# Patient Record
Sex: MALE | Race: WHITE | NOT HISPANIC OR LATINO | Employment: FULL TIME | URBAN - METROPOLITAN AREA
[De-identification: names, ages, dates, MRNs, and addresses within clinical notes are randomized per-mention and may not be internally consistent; named-entity substitution may affect disease eponyms.]

---

## 2023-03-09 LAB
ALANINE AMINOTRANSFERASE (SGPT) (U/L) IN SER/PLAS: 26 U/L (ref 10–52)
ALBUMIN (G/DL) IN SER/PLAS: 4.4 G/DL (ref 3.4–5)
ALKALINE PHOSPHATASE (U/L) IN SER/PLAS: 93 U/L (ref 33–136)
ANION GAP IN SER/PLAS: 16 MMOL/L (ref 10–20)
ASPARTATE AMINOTRANSFERASE (SGOT) (U/L) IN SER/PLAS: 16 U/L (ref 9–39)
BILIRUBIN TOTAL (MG/DL) IN SER/PLAS: 0.7 MG/DL (ref 0–1.2)
CALCIUM (MG/DL) IN SER/PLAS: 9.8 MG/DL (ref 8.6–10.6)
CARBON DIOXIDE, TOTAL (MMOL/L) IN SER/PLAS: 29 MMOL/L (ref 21–32)
CHLORIDE (MMOL/L) IN SER/PLAS: 98 MMOL/L (ref 98–107)
CHOLESTEROL (MG/DL) IN SER/PLAS: 122 MG/DL (ref 0–199)
CHOLESTEROL IN HDL (MG/DL) IN SER/PLAS: 39.3 MG/DL
CHOLESTEROL/HDL RATIO: 3.1
CREATININE (MG/DL) IN SER/PLAS: 0.96 MG/DL (ref 0.5–1.3)
ESTIMATED AVERAGE GLUCOSE FOR HBA1C: 157 MG/DL
GFR MALE: 87 ML/MIN/1.73M2
GLUCOSE (MG/DL) IN SER/PLAS: 125 MG/DL (ref 74–99)
HEMOGLOBIN A1C/HEMOGLOBIN TOTAL IN BLOOD: 7.1 %
LDL: 59 MG/DL (ref 0–99)
POTASSIUM (MMOL/L) IN SER/PLAS: 4.5 MMOL/L (ref 3.5–5.3)
PROTEIN TOTAL: 7.3 G/DL (ref 6.4–8.2)
SODIUM (MMOL/L) IN SER/PLAS: 138 MMOL/L (ref 136–145)
THYROTROPIN (MIU/L) IN SER/PLAS BY DETECTION LIMIT <= 0.05 MIU/L: 2.66 MIU/L (ref 0.44–3.98)
TRIGLYCERIDE (MG/DL) IN SER/PLAS: 120 MG/DL (ref 0–149)
UREA NITROGEN (MG/DL) IN SER/PLAS: 28 MG/DL (ref 6–23)
VLDL: 24 MG/DL (ref 0–40)

## 2023-03-13 PROBLEM — E66.3 OVERWEIGHT WITH BODY MASS INDEX (BMI) OF 29 TO 29.9 IN ADULT: Status: ACTIVE | Noted: 2023-03-13

## 2023-03-13 PROBLEM — E78.00 ELEVATED LOW DENSITY LIPOPROTEIN (LDL) CHOLESTEROL LEVEL: Status: ACTIVE | Noted: 2023-03-13

## 2023-03-13 PROBLEM — M70.42 PREPATELLAR BURSITIS OF LEFT KNEE: Status: ACTIVE | Noted: 2023-03-13

## 2023-03-13 PROBLEM — Z97.3 WEARS READING EYEGLASSES: Status: ACTIVE | Noted: 2023-03-13

## 2023-03-13 PROBLEM — R21 RASH: Status: ACTIVE | Noted: 2023-03-13

## 2023-03-13 PROBLEM — H25.099 SENILE INCIPIENT CATARACT: Status: ACTIVE | Noted: 2023-03-13

## 2023-03-13 PROBLEM — I10 HYPERTENSION: Status: ACTIVE | Noted: 2023-03-13

## 2023-03-13 PROBLEM — H53.121 TRANSIENT VISUAL LOSS OF RIGHT EYE: Status: ACTIVE | Noted: 2023-03-13

## 2023-03-13 PROBLEM — G45.3 AF (AMAUROSIS FUGAX): Status: ACTIVE | Noted: 2023-03-13

## 2023-03-13 PROBLEM — E11.9 DIABETES MELLITUS (MULTI): Status: ACTIVE | Noted: 2023-03-13

## 2023-03-13 PROBLEM — E03.9 HYPOTHYROIDISM: Status: ACTIVE | Noted: 2023-03-13

## 2023-03-13 PROBLEM — R07.9 CHEST PAIN: Status: ACTIVE | Noted: 2023-03-13

## 2023-03-13 PROBLEM — M79.89 LEG SWELLING: Status: ACTIVE | Noted: 2023-03-13

## 2023-03-13 PROBLEM — M25.562 LEFT KNEE PAIN: Status: ACTIVE | Noted: 2023-03-13

## 2023-03-13 PROBLEM — I25.10 CAD (CORONARY ARTERY DISEASE): Status: ACTIVE | Noted: 2023-03-13

## 2023-03-13 PROBLEM — H35.61 RETINAL DOT HEMORRHAGE OF RIGHT EYE: Status: ACTIVE | Noted: 2023-03-13

## 2023-03-13 RX ORDER — ATORVASTATIN CALCIUM 80 MG/1
80 TABLET, FILM COATED ORAL NIGHTLY
COMMUNITY
Start: 2012-07-03 | End: 2024-02-27 | Stop reason: SDUPTHER

## 2023-03-13 RX ORDER — METOPROLOL SUCCINATE 100 MG/1
100 TABLET, EXTENDED RELEASE ORAL DAILY
COMMUNITY
Start: 2013-09-06 | End: 2024-02-27 | Stop reason: SDUPTHER

## 2023-03-13 RX ORDER — METFORMIN HYDROCHLORIDE 500 MG/1
500 TABLET ORAL 2 TIMES DAILY
COMMUNITY
Start: 2022-05-06 | End: 2023-11-21 | Stop reason: ALTCHOICE

## 2023-03-13 RX ORDER — LEVOTHYROXINE SODIUM 75 UG/1
75 TABLET ORAL DAILY
COMMUNITY
Start: 2013-02-08 | End: 2023-04-17 | Stop reason: SDUPTHER

## 2023-03-13 RX ORDER — ASPIRIN 325 MG
325 TABLET ORAL DAILY
COMMUNITY
Start: 2013-09-06

## 2023-03-13 RX ORDER — AMLODIPINE BESYLATE 5 MG/1
5 TABLET ORAL DAILY
COMMUNITY
Start: 2015-03-16 | End: 2024-02-27 | Stop reason: SDUPTHER

## 2023-03-13 RX ORDER — LISINOPRIL 40 MG/1
40 TABLET ORAL DAILY
COMMUNITY
End: 2024-01-05 | Stop reason: ALTCHOICE

## 2023-03-13 RX ORDER — SPIRONOLACTONE AND HYDROCHLOROTHIAZIDE 25; 25 MG/1; MG/1
1 TABLET ORAL DAILY
COMMUNITY
Start: 2018-05-21 | End: 2024-02-27 | Stop reason: SDUPTHER

## 2023-03-15 ENCOUNTER — OFFICE VISIT (OUTPATIENT)
Dept: PRIMARY CARE | Facility: CLINIC | Age: 68
End: 2023-03-15
Payer: MEDICARE

## 2023-03-15 VITALS
SYSTOLIC BLOOD PRESSURE: 124 MMHG | WEIGHT: 215.8 LBS | DIASTOLIC BLOOD PRESSURE: 70 MMHG | BODY MASS INDEX: 30.21 KG/M2 | HEIGHT: 71 IN

## 2023-03-15 DIAGNOSIS — E13.9 OTHER SPECIFIED DIABETES MELLITUS WITHOUT COMPLICATION, WITHOUT LONG-TERM CURRENT USE OF INSULIN (MULTI): ICD-10-CM

## 2023-03-15 PROCEDURE — 3078F DIAST BP <80 MM HG: CPT | Performed by: INTERNAL MEDICINE

## 2023-03-15 PROCEDURE — 3051F HG A1C>EQUAL 7.0%<8.0%: CPT | Performed by: INTERNAL MEDICINE

## 2023-03-15 PROCEDURE — 3074F SYST BP LT 130 MM HG: CPT | Performed by: INTERNAL MEDICINE

## 2023-03-15 PROCEDURE — 4010F ACE/ARB THERAPY RXD/TAKEN: CPT | Performed by: INTERNAL MEDICINE

## 2023-03-15 PROCEDURE — 99213 OFFICE O/P EST LOW 20 MIN: CPT | Performed by: INTERNAL MEDICINE

## 2023-03-15 NOTE — PROGRESS NOTES
OFFICE NOTE    NAME OF THE PATIENT: Michael Silva    YOB: 1955    CHIEF COMPLAINT:  This gentleman today came here for follow-up on various conditions.  Overall, he is a happy person.  Appetite and weight are okay.  No chest pain.  Taking medications.  With Rybelsus he did lose weight initially, then his weight-loss has stopped.  He is concerned.  Blood sugar okay.  He is traveling a lot after residential to [place].  Otherwise feeling okay.  No problem.    PAST MEDICAL HISTORY:  Reviewed on EMR, unchanged.    CURRENT MEDICATIONS:  Reviewed on EMR, unchanged.    ALLERGIES:  Reviewed on EMR, unchanged.    SOCIAL HISTORY:  Reviewed on EMR, unchanged.  He does not smoke.    FAMILY HISTORY:  Reviewed on EMR, unchanged.    REVIEW OF SYSTEMS:  All 12 systems reviewed and pertaining covered in history and physical.    PHYSICAL EXAMINATION  VITAL SIGNS:  As recorded and reviewed from EMR.  RESPIRATORY:  The patient had normal inspirations and expirations.  The breath sounds were equal bilaterally and clear to auscultation.  CARDIOVASCULAR:  The patient had S1 normal, split S2 without obvious rubs, clicks, or murmurs.    GASTROINTESTINAL:  There was no hepatosplenomegaly.  There were no palpable masses and no inguinal nodes.  EXTREMITIES:  Legs had no edema.  NEUROLOGIC:  The patient had normal cranial nerves.  The reflexes, sensory, and motor examination were grossly within normal limits.    LAB WORK:  Laboratory testing discussed.    ASSESSMENT AND PLAN:  Type 2 diabetes.  Hemoglobin A1c is slightly up.  Diet, exercise.  He is regular with the eye checkup.  Hypertension, okay.  High cholesterol, stable.  Prostate.  Monitor.  Follow-up appointment with me in three to four months.  Blood work discussed.  Refill given.  Continue to follow.    Kindly review this note in conjunction with EMR.   Subjective   Patient ID: Michael Silva is a 67 y.o. male who presents for Follow-up.      HPI    Review of  "Systems    Objective   /70   Ht 1.803 m (5' 11\")   Wt 97.9 kg (215 lb 12.8 oz)   BMI 30.10 kg/m²       Physical Exam    Assessment/Plan   Problem List Items Addressed This Visit          Endocrine/Metabolic    Diabetes mellitus (CMS/HCC)         "

## 2023-03-27 ENCOUNTER — DOCUMENTATION (OUTPATIENT)
Dept: PRIMARY CARE | Facility: CLINIC | Age: 68
End: 2023-03-27
Payer: MEDICARE

## 2023-04-17 DIAGNOSIS — E03.9 HYPOTHYROIDISM, UNSPECIFIED TYPE: ICD-10-CM

## 2023-04-17 RX ORDER — LEVOTHYROXINE SODIUM 75 UG/1
75 TABLET ORAL DAILY
Qty: 90 TABLET | Refills: 0 | Status: SHIPPED | OUTPATIENT
Start: 2023-04-17 | End: 2023-06-16 | Stop reason: SDUPTHER

## 2023-06-16 DIAGNOSIS — E03.9 HYPOTHYROIDISM, UNSPECIFIED TYPE: ICD-10-CM

## 2023-06-17 RX ORDER — LEVOTHYROXINE SODIUM 75 UG/1
75 TABLET ORAL DAILY
Qty: 90 TABLET | Refills: 1 | Status: SHIPPED | OUTPATIENT
Start: 2023-06-17 | End: 2023-11-27 | Stop reason: SDUPTHER

## 2023-08-24 ENCOUNTER — LAB (OUTPATIENT)
Dept: LAB | Facility: LAB | Age: 68
End: 2023-08-24
Payer: MEDICARE

## 2023-08-24 DIAGNOSIS — E13.9 OTHER SPECIFIED DIABETES MELLITUS WITHOUT COMPLICATION, WITHOUT LONG-TERM CURRENT USE OF INSULIN (MULTI): ICD-10-CM

## 2023-08-24 LAB
ALANINE AMINOTRANSFERASE (SGPT) (U/L) IN SER/PLAS: 28 U/L (ref 10–52)
ALBUMIN (G/DL) IN SER/PLAS: 4.9 G/DL (ref 3.4–5)
ALKALINE PHOSPHATASE (U/L) IN SER/PLAS: 83 U/L (ref 33–136)
ANION GAP IN SER/PLAS: 17 MMOL/L (ref 10–20)
ASPARTATE AMINOTRANSFERASE (SGOT) (U/L) IN SER/PLAS: 17 U/L (ref 9–39)
BILIRUBIN TOTAL (MG/DL) IN SER/PLAS: 0.6 MG/DL (ref 0–1.2)
CALCIUM (MG/DL) IN SER/PLAS: 9.9 MG/DL (ref 8.6–10.6)
CARBON DIOXIDE, TOTAL (MMOL/L) IN SER/PLAS: 26 MMOL/L (ref 21–32)
CHLORIDE (MMOL/L) IN SER/PLAS: 97 MMOL/L (ref 98–107)
CHOLESTEROL (MG/DL) IN SER/PLAS: 143 MG/DL (ref 0–199)
CHOLESTEROL IN HDL (MG/DL) IN SER/PLAS: 40.2 MG/DL
CHOLESTEROL/HDL RATIO: 3.6
CREATININE (MG/DL) IN SER/PLAS: 1.09 MG/DL (ref 0.5–1.3)
ESTIMATED AVERAGE GLUCOSE FOR HBA1C: 171 MG/DL
GFR MALE: 75 ML/MIN/1.73M2
GLUCOSE (MG/DL) IN SER/PLAS: 152 MG/DL (ref 74–99)
HEMOGLOBIN A1C/HEMOGLOBIN TOTAL IN BLOOD: 7.6 %
LDL: 63 MG/DL (ref 0–99)
NON HDL CHOLESTEROL: 103 MG/DL
POTASSIUM (MMOL/L) IN SER/PLAS: 4.6 MMOL/L (ref 3.5–5.3)
PROTEIN TOTAL: 7.9 G/DL (ref 6.4–8.2)
SODIUM (MMOL/L) IN SER/PLAS: 135 MMOL/L (ref 136–145)
TRIGLYCERIDE (MG/DL) IN SER/PLAS: 201 MG/DL (ref 0–149)
UREA NITROGEN (MG/DL) IN SER/PLAS: 29 MG/DL (ref 6–23)
VLDL: 40 MG/DL (ref 0–40)

## 2023-08-24 PROCEDURE — 80053 COMPREHEN METABOLIC PANEL: CPT

## 2023-08-24 PROCEDURE — 83036 HEMOGLOBIN GLYCOSYLATED A1C: CPT

## 2023-08-24 PROCEDURE — 36415 COLL VENOUS BLD VENIPUNCTURE: CPT

## 2023-08-24 PROCEDURE — 80061 LIPID PANEL: CPT

## 2023-08-29 ENCOUNTER — OFFICE VISIT (OUTPATIENT)
Dept: PRIMARY CARE | Facility: CLINIC | Age: 68
End: 2023-08-29
Payer: MEDICARE

## 2023-08-29 VITALS
BODY MASS INDEX: 30.94 KG/M2 | HEIGHT: 71 IN | SYSTOLIC BLOOD PRESSURE: 126 MMHG | DIASTOLIC BLOOD PRESSURE: 76 MMHG | WEIGHT: 221 LBS

## 2023-08-29 DIAGNOSIS — E13.9 OTHER SPECIFIED DIABETES MELLITUS WITHOUT COMPLICATION, WITHOUT LONG-TERM CURRENT USE OF INSULIN (MULTI): ICD-10-CM

## 2023-08-29 DIAGNOSIS — I10 HYPERTENSION, UNSPECIFIED TYPE: ICD-10-CM

## 2023-08-29 DIAGNOSIS — N40.0 BENIGN PROSTATIC HYPERPLASIA, UNSPECIFIED WHETHER LOWER URINARY TRACT SYMPTOMS PRESENT: ICD-10-CM

## 2023-08-29 PROCEDURE — 99214 OFFICE O/P EST MOD 30 MIN: CPT | Performed by: INTERNAL MEDICINE

## 2023-08-29 PROCEDURE — 3051F HG A1C>EQUAL 7.0%<8.0%: CPT | Performed by: INTERNAL MEDICINE

## 2023-08-29 PROCEDURE — 1126F AMNT PAIN NOTED NONE PRSNT: CPT | Performed by: INTERNAL MEDICINE

## 2023-08-29 PROCEDURE — 4010F ACE/ARB THERAPY RXD/TAKEN: CPT | Performed by: INTERNAL MEDICINE

## 2023-08-29 PROCEDURE — 1159F MED LIST DOCD IN RCRD: CPT | Performed by: INTERNAL MEDICINE

## 2023-08-29 PROCEDURE — 3078F DIAST BP <80 MM HG: CPT | Performed by: INTERNAL MEDICINE

## 2023-08-29 PROCEDURE — 3074F SYST BP LT 130 MM HG: CPT | Performed by: INTERNAL MEDICINE

## 2023-08-29 RX ORDER — TAMSULOSIN HYDROCHLORIDE 0.4 MG/1
0.4 CAPSULE ORAL DAILY
Qty: 90 CAPSULE | Refills: 1 | Status: SHIPPED | OUTPATIENT
Start: 2023-08-29 | End: 2024-02-15 | Stop reason: SDUPTHER

## 2023-08-29 ASSESSMENT — ENCOUNTER SYMPTOMS
LOSS OF SENSATION IN FEET: 0
DEPRESSION: 0
OCCASIONAL FEELINGS OF UNSTEADINESS: 0

## 2023-08-29 NOTE — PROGRESS NOTES
OFFICE NOTE    NAME OF THE PATIENT: Michael Silva    YOB: 1955    CHIEF COMPLAINT:  This gentleman today came here for multiple medical issues.  BPH symptoms wakes up at night.  Increased frequency of urine, lower abdominal pressure.  No fever or chills.  He has no prostate cancer in the family.  He came here for follow-up on various conditions.  Appetite and weight are okay.  No problem.  He came here for blood work and other conditions.  He was in [place].  He had a nice time.    PAST MEDICAL HISTORY:  Reviewed on EMR, unchanged.    CURRENT MEDICATIONS:  Reviewed on EMR, unchanged.  List reviewed.    ALLERGIES:  Reviewed on EMR, unchanged.    SOCIAL HISTORY:  Reviewed on EMR, unchanged.  He does not smoke and does not drink alcohol.    FAMILY HISTORY:  Reviewed on EMR, unchanged.    REVIEW OF SYSTEMS:  All 12 systems reviewed and pertaining covered in history and physical.    PHYSICAL EXAMINATION  VITAL SIGNS:  As recorded and reviewed from EMR.  RESPIRATORY:  The patient had normal inspirations and expirations.  The breath sounds were equal bilaterally and clear to auscultation.  CARDIOVASCULAR:  The patient had S1 normal, split S2 without obvious rubs, clicks, or murmurs.    GASTROINTESTINAL:  There was no hepatosplenomegaly.  There were no palpable masses and no inguinal nodes.  EXTREMITIES:  Legs had no edema.  NEUROLOGIC:  The patient had normal cranial nerves.  The reflexes, sensory, and motor examination were grossly within normal limits.    LAB WORK:  Laboratory testing discussed.    ASSESSMENT AND PLAN:  BPH.  We are going to start Flomax.  PSA was okay in December.  Prostate is slightly enlarged.  Type 2 diabetes.  Hemoglobin A1c is high.  Continue Rybelsus and Jardiance.  I urged him to take metformin and regular exercise.  Monitor.  Hypertension, okay.  High cholesterol, stable.  Prostate health, okay.  I shall see him back in November.      Kindly review this note in  "conjunction with EMR.   Subjective   Patient ID: Michael Silva is a 68 y.o. male who presents for Follow-up.      HPI    Review of Systems    Objective   /76   Ht 1.803 m (5' 11\")   Wt 100 kg (221 lb)   BMI 30.82 kg/m²       Physical Exam    Assessment/Plan   Problem List Items Addressed This Visit       Diabetes mellitus (CMS/[facility])         "

## 2023-11-16 ENCOUNTER — LAB (OUTPATIENT)
Dept: LAB | Facility: LAB | Age: 68
End: 2023-11-16
Payer: MEDICARE

## 2023-11-16 DIAGNOSIS — E13.9 OTHER SPECIFIED DIABETES MELLITUS WITHOUT COMPLICATION, WITHOUT LONG-TERM CURRENT USE OF INSULIN (MULTI): ICD-10-CM

## 2023-11-16 DIAGNOSIS — I10 HYPERTENSION, UNSPECIFIED TYPE: ICD-10-CM

## 2023-11-16 LAB
ALBUMIN SERPL BCP-MCNC: 4.9 G/DL (ref 3.4–5)
ALP SERPL-CCNC: 79 U/L (ref 33–136)
ALT SERPL W P-5'-P-CCNC: 22 U/L (ref 10–52)
ANION GAP SERPL CALC-SCNC: 16 MMOL/L (ref 10–20)
AST SERPL W P-5'-P-CCNC: 15 U/L (ref 9–39)
BILIRUB SERPL-MCNC: 0.8 MG/DL (ref 0–1.2)
BUN SERPL-MCNC: 26 MG/DL (ref 6–23)
CALCIUM SERPL-MCNC: 9.6 MG/DL (ref 8.6–10.6)
CHLORIDE SERPL-SCNC: 99 MMOL/L (ref 98–107)
CO2 SERPL-SCNC: 27 MMOL/L (ref 21–32)
CREAT SERPL-MCNC: 1 MG/DL (ref 0.5–1.3)
EST. AVERAGE GLUCOSE BLD GHB EST-MCNC: 151 MG/DL
GFR SERPL CREATININE-BSD FRML MDRD: 83 ML/MIN/1.73M*2
GLUCOSE SERPL-MCNC: 119 MG/DL (ref 74–99)
HBA1C MFR BLD: 6.9 %
POTASSIUM SERPL-SCNC: 4.6 MMOL/L (ref 3.5–5.3)
PROT SERPL-MCNC: 7.4 G/DL (ref 6.4–8.2)
SODIUM SERPL-SCNC: 137 MMOL/L (ref 136–145)
TSH SERPL-ACNC: 2.98 MIU/L (ref 0.44–3.98)

## 2023-11-16 PROCEDURE — 80053 COMPREHEN METABOLIC PANEL: CPT

## 2023-11-16 PROCEDURE — 84443 ASSAY THYROID STIM HORMONE: CPT

## 2023-11-16 PROCEDURE — 36415 COLL VENOUS BLD VENIPUNCTURE: CPT

## 2023-11-16 PROCEDURE — 83036 HEMOGLOBIN GLYCOSYLATED A1C: CPT

## 2023-11-21 ENCOUNTER — OFFICE VISIT (OUTPATIENT)
Dept: PRIMARY CARE | Facility: CLINIC | Age: 68
End: 2023-11-21
Payer: MEDICARE

## 2023-11-21 VITALS
SYSTOLIC BLOOD PRESSURE: 132 MMHG | HEIGHT: 71 IN | WEIGHT: 219 LBS | BODY MASS INDEX: 30.66 KG/M2 | DIASTOLIC BLOOD PRESSURE: 72 MMHG

## 2023-11-21 DIAGNOSIS — Z12.11 ENCOUNTER FOR SCREENING COLONOSCOPY: ICD-10-CM

## 2023-11-21 DIAGNOSIS — E13.9 OTHER SPECIFIED DIABETES MELLITUS WITHOUT COMPLICATION, WITHOUT LONG-TERM CURRENT USE OF INSULIN (MULTI): ICD-10-CM

## 2023-11-21 DIAGNOSIS — I10 HYPERTENSION, UNSPECIFIED TYPE: ICD-10-CM

## 2023-11-21 DIAGNOSIS — E03.9 HYPOTHYROIDISM, UNSPECIFIED TYPE: ICD-10-CM

## 2023-11-21 DIAGNOSIS — E78.00 HIGH CHOLESTEROL: Primary | ICD-10-CM

## 2023-11-21 DIAGNOSIS — Z12.5 ENCOUNTER FOR SCREENING FOR MALIGNANT NEOPLASM OF PROSTATE: ICD-10-CM

## 2023-11-21 PROBLEM — H26.9 CATARACTA: Status: ACTIVE | Noted: 2023-11-21

## 2023-11-21 PROCEDURE — 99213 OFFICE O/P EST LOW 20 MIN: CPT | Performed by: INTERNAL MEDICINE

## 2023-11-21 PROCEDURE — 1159F MED LIST DOCD IN RCRD: CPT | Performed by: INTERNAL MEDICINE

## 2023-11-21 PROCEDURE — 1126F AMNT PAIN NOTED NONE PRSNT: CPT | Performed by: INTERNAL MEDICINE

## 2023-11-21 PROCEDURE — 4010F ACE/ARB THERAPY RXD/TAKEN: CPT | Performed by: INTERNAL MEDICINE

## 2023-11-21 PROCEDURE — 3075F SYST BP GE 130 - 139MM HG: CPT | Performed by: INTERNAL MEDICINE

## 2023-11-21 PROCEDURE — 3044F HG A1C LEVEL LT 7.0%: CPT | Performed by: INTERNAL MEDICINE

## 2023-11-21 PROCEDURE — 3078F DIAST BP <80 MM HG: CPT | Performed by: INTERNAL MEDICINE

## 2023-11-21 RX ORDER — LEVOTHYROXINE SODIUM 75 UG/1
75 TABLET ORAL DAILY
Qty: 90 TABLET | Refills: 1 | Status: CANCELLED | OUTPATIENT
Start: 2023-11-21

## 2023-11-22 NOTE — PROGRESS NOTES
"Subjective   Patient ID: Michael Silva is a 68 y.o. male who presents for Follow-up (multiple medical issues.).    Michael Silva today came here for multiple medical issues.  Overall, he is a happy person.  Appetite and weight are okay.  No chest pain.  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  He came here for follow-up on various conditions.    I have personally reviewed the patient's Past Medical History, Medications, Allergies, Social History, and Family History in the EMR.    Review of Systems   All other systems reviewed and are negative.    Objective   /72   Ht 1.803 m (5' 11\")   Wt 99.3 kg (219 lb)   BMI 30.54 kg/m²     Physical Exam  Vitals reviewed.   Cardiovascular:      Heart sounds: Normal heart sounds, S1 normal and S2 normal. No murmur heard.     No friction rub.   Pulmonary:      Effort: Pulmonary effort is normal.      Breath sounds: Normal breath sounds and air entry.   Abdominal:      Palpations: There is no hepatomegaly, splenomegaly or mass.   Musculoskeletal:      Right lower leg: No edema.      Left lower leg: No edema.   Lymphadenopathy:      Lower Body: No right inguinal adenopathy. No left inguinal adenopathy.   Neurological:      Cranial Nerves: Cranial nerves 2-12 are intact.      Sensory: No sensory deficit.      Motor: Motor function is intact.      Deep Tendon Reflexes: Reflexes are normal and symmetric.     LAB WORK:  Laboratory testing discussed.    Assessment/Plan   Problem List Items Addressed This Visit             ICD-10-CM       Cardiac and Vasculature    Hypertension I10    Relevant Orders    Comprehensive Metabolic Panel    Thyroid Stimulating Hormone    Lipid Panel    Hemoglobin A1C       Endocrine/Metabolic    Diabetes mellitus (CMS/HCC) E11.9    Relevant Orders    Comprehensive Metabolic Panel    Thyroid Stimulating Hormone    Lipid Panel    Hemoglobin A1C    Hypothyroidism E03.9    " Relevant Orders    Comprehensive Metabolic Panel    Thyroid Stimulating Hormone    Lipid Panel    Hemoglobin A1C     Other Visit Diagnoses         Codes    High cholesterol    -  Primary E78.00    Encounter for screening colonoscopy     Z12.11    Relevant Orders    Cologuard® colon cancer screening    Encounter for screening for malignant neoplasm of prostate     Z12.5        1. Type 2 diabetes.  Hemoglobin A1c is perfect.  2. Hypertension, okay.  3. High cholesterol, stable.  4. Thyroid, stable.  5. Colonoscopy, he does not want.  We will try some ______.  6. Prostate health, okay.  7. Follow up in February.  Happy to see him anytime sooner if necessary.  8. Please start off with work.    Scribe Attestation  By signing my name below, IAmira Scribe attest that this documentation has been prepared under the direction and in the presence of Radha Jessica MD.

## 2023-11-27 DIAGNOSIS — E03.9 HYPOTHYROIDISM, UNSPECIFIED TYPE: ICD-10-CM

## 2023-11-27 RX ORDER — LEVOTHYROXINE SODIUM 75 UG/1
75 TABLET ORAL DAILY
Qty: 90 TABLET | Refills: 1 | Status: SHIPPED | OUTPATIENT
Start: 2023-11-27 | End: 2024-02-27 | Stop reason: SDUPTHER

## 2024-01-05 ENCOUNTER — OFFICE VISIT (OUTPATIENT)
Dept: CARDIOLOGY | Facility: CLINIC | Age: 69
End: 2024-01-05
Payer: MEDICARE

## 2024-01-05 VITALS
WEIGHT: 223.6 LBS | HEART RATE: 66 BPM | HEIGHT: 71 IN | OXYGEN SATURATION: 97 % | SYSTOLIC BLOOD PRESSURE: 134 MMHG | DIASTOLIC BLOOD PRESSURE: 68 MMHG | BODY MASS INDEX: 31.3 KG/M2

## 2024-01-05 DIAGNOSIS — I10 PRIMARY HYPERTENSION: Primary | ICD-10-CM

## 2024-01-05 PROCEDURE — 3075F SYST BP GE 130 - 139MM HG: CPT | Performed by: INTERNAL MEDICINE

## 2024-01-05 PROCEDURE — 1036F TOBACCO NON-USER: CPT | Performed by: INTERNAL MEDICINE

## 2024-01-05 PROCEDURE — 1159F MED LIST DOCD IN RCRD: CPT | Performed by: INTERNAL MEDICINE

## 2024-01-05 PROCEDURE — 99214 OFFICE O/P EST MOD 30 MIN: CPT | Performed by: INTERNAL MEDICINE

## 2024-01-05 PROCEDURE — 1126F AMNT PAIN NOTED NONE PRSNT: CPT | Performed by: INTERNAL MEDICINE

## 2024-01-05 PROCEDURE — 3078F DIAST BP <80 MM HG: CPT | Performed by: INTERNAL MEDICINE

## 2024-01-05 PROCEDURE — 4010F ACE/ARB THERAPY RXD/TAKEN: CPT | Performed by: INTERNAL MEDICINE

## 2024-01-05 ASSESSMENT — COLUMBIA-SUICIDE SEVERITY RATING SCALE - C-SSRS
1. IN THE PAST MONTH, HAVE YOU WISHED YOU WERE DEAD OR WISHED YOU COULD GO TO SLEEP AND NOT WAKE UP?: NO
6. HAVE YOU EVER DONE ANYTHING, STARTED TO DO ANYTHING, OR PREPARED TO DO ANYTHING TO END YOUR LIFE?: NO
2. HAVE YOU ACTUALLY HAD ANY THOUGHTS OF KILLING YOURSELF?: NO

## 2024-01-05 ASSESSMENT — PATIENT HEALTH QUESTIONNAIRE - PHQ9
1. LITTLE INTEREST OR PLEASURE IN DOING THINGS: NOT AT ALL
SUM OF ALL RESPONSES TO PHQ9 QUESTIONS 1 AND 2: 0
2. FEELING DOWN, DEPRESSED OR HOPELESS: NOT AT ALL

## 2024-01-05 ASSESSMENT — PAIN SCALES - GENERAL: PAINLEVEL: 0-NO PAIN

## 2024-01-05 NOTE — PROGRESS NOTES
Zaid Silva is a 68 y.o. male.    Chief Complaint:  Follow-up    HPI    Review of Systems   All other systems reviewed and are negative.      Objective   Cardiovascular:      PMI at left midclavicular line. Normal rate. Regular rhythm. Normal S1. Normal S2.       Murmurs: There is no murmur.      No gallop.  No click. No rub.   Pulses:     Intact distal pulses.   Edema:     Peripheral edema absent.         Lab Review:   Lab on 11/16/2023   Component Date Value    Glucose 11/16/2023 119 (H)     Sodium 11/16/2023 137     Potassium 11/16/2023 4.6     Chloride 11/16/2023 99     Bicarbonate 11/16/2023 27     Anion Gap 11/16/2023 16     Urea Nitrogen 11/16/2023 26 (H)     Creatinine 11/16/2023 1.00     eGFR 11/16/2023 83     Calcium 11/16/2023 9.6     Albumin 11/16/2023 4.9     Alkaline Phosphatase 11/16/2023 79     Total Protein 11/16/2023 7.4     AST 11/16/2023 15     Bilirubin, Total 11/16/2023 0.8     ALT 11/16/2023 22     Hemoglobin A1C 11/16/2023 6.9 (H)     Estimated Average Glucose 11/16/2023 151     Thyroid Stimulating Horm* 11/16/2023 2.98    Lab on 08/24/2023   Component Date Value    Glucose 08/24/2023 152 (H)     Sodium 08/24/2023 135 (L)     Potassium 08/24/2023 4.6     Chloride 08/24/2023 97 (L)     Bicarbonate 08/24/2023 26     Anion Gap 08/24/2023 17     Urea Nitrogen 08/24/2023 29 (H)     Creatinine 08/24/2023 1.09     GFR MALE 08/24/2023 75     Calcium 08/24/2023 9.9     Albumin 08/24/2023 4.9     Alkaline Phosphatase 08/24/2023 83     Total Protein 08/24/2023 7.9     AST 08/24/2023 17     Total Bilirubin 08/24/2023 0.6     ALT (SGPT) 08/24/2023 28     Hemoglobin A1C 08/24/2023 7.6 (A)     Estimated Average Glucose 08/24/2023 171     Cholesterol 08/24/2023 143     HDL 08/24/2023 40.2     Cholesterol/HDL Ratio 08/24/2023 3.6     LDL 08/24/2023 63     VLDL 08/24/2023 40     Triglycerides 08/24/2023 201 (H)     Non HDL Cholesterol 08/24/2023 103        Assessment/Plan     1. CAD with PCI  stent deployment to high-grade RCA stenosis, 09/06/2001. The patient is actually doing quite well, working hard, no complaints. He had a negative surveillance nuclear stress test on 3/7/2014. He will remain on his present therapy unchanged which includes aspirin 325 mg daily, metoprolol  mg daily, quinapril 40 mg daily, and spironolactone 25-HCTZ 25 mg daily. Recheck office in 6 months. Echo done 10/2019 showed EF 55-60% with mild hypokinesis of the inferoposterior wall and mild AV sclerosis. We will have him return in six months with pharmacologic nuclear stress testing.   2. Hypertension. The systolic blood pressure is adequately controlled today. The patient will continue on current therapy amlodipine 5 mg daily daily along with the present higher dosages of metoprolol ER, quinapril, and aldactone.  3. Mixed dyslipidemia. Reasonable response to the atorvastatin 80 mg. The patient's blood work on 01/2020 included a cholesterol 122 LDL 56 HDL 35 triglycerides 155. TSH 2.96. The CBC and SMA panels were normal. Additional lab work from 9/26/2020 included cholesterol 118 LDL 57 HDL 36 triglyceride 128. He had additional lab work performed on 05/2022 with cholesterol 122 HDL 34 LDL 51 triglyceride 183.  4. Type 2 diabetes. Diabetic control remains fair with recent glycohemoglobin being 6.9% when checked on 9/26/2020 and 8.1% when checked on 05/2022.  5. Obesity.  6. History of obstructive sleep apnea. No longer using cpap since losing weight.  7. Positive family history of CAD.  8. Primary hypothyroidism.  9. Negative ABIs 08/2019.  10. Carotid vascular disease. This patient had an episode of transient visual loss involving the right eye in 10/2019. A CT angiogram of the head and neck showed atherosclerotic calcification involving the carotid arteries without significant luminal narrowing. There was also atherosclerotic calcification involving the intracranial portions of the bilateral internal carotid arteries  without significant luminal narrowing.  11. Hx of covid-19 vaccine #1 and #2.

## 2024-01-09 RX ORDER — LISINOPRIL 20 MG/1
20 TABLET ORAL DAILY
Qty: 90 TABLET | Refills: 3 | Status: SHIPPED | OUTPATIENT
Start: 2024-01-09 | End: 2024-02-27 | Stop reason: SDUPTHER

## 2024-02-15 DIAGNOSIS — E13.9 OTHER SPECIFIED DIABETES MELLITUS WITHOUT COMPLICATION, WITHOUT LONG-TERM CURRENT USE OF INSULIN (MULTI): ICD-10-CM

## 2024-02-15 DIAGNOSIS — N40.0 BENIGN PROSTATIC HYPERPLASIA, UNSPECIFIED WHETHER LOWER URINARY TRACT SYMPTOMS PRESENT: ICD-10-CM

## 2024-02-15 RX ORDER — TAMSULOSIN HYDROCHLORIDE 0.4 MG/1
0.4 CAPSULE ORAL DAILY
Qty: 90 CAPSULE | Refills: 1 | Status: SHIPPED | OUTPATIENT
Start: 2024-02-15 | End: 2024-02-27 | Stop reason: SDUPTHER

## 2024-02-23 ENCOUNTER — LAB (OUTPATIENT)
Dept: LAB | Facility: LAB | Age: 69
End: 2024-02-23
Payer: MEDICARE

## 2024-02-23 DIAGNOSIS — E03.9 HYPOTHYROIDISM, UNSPECIFIED TYPE: ICD-10-CM

## 2024-02-23 DIAGNOSIS — I10 HYPERTENSION, UNSPECIFIED TYPE: ICD-10-CM

## 2024-02-23 DIAGNOSIS — E13.9 OTHER SPECIFIED DIABETES MELLITUS WITHOUT COMPLICATION, WITHOUT LONG-TERM CURRENT USE OF INSULIN (MULTI): ICD-10-CM

## 2024-02-23 LAB
ALBUMIN SERPL BCP-MCNC: 4.4 G/DL (ref 3.4–5)
ALP SERPL-CCNC: 84 U/L (ref 33–136)
ALT SERPL W P-5'-P-CCNC: 24 U/L (ref 10–52)
ANION GAP SERPL CALC-SCNC: 17 MMOL/L (ref 10–20)
AST SERPL W P-5'-P-CCNC: 15 U/L (ref 9–39)
BILIRUB SERPL-MCNC: 0.7 MG/DL (ref 0–1.2)
BUN SERPL-MCNC: 22 MG/DL (ref 6–23)
CALCIUM SERPL-MCNC: 9.8 MG/DL (ref 8.6–10.6)
CHLORIDE SERPL-SCNC: 100 MMOL/L (ref 98–107)
CHOLEST SERPL-MCNC: 99 MG/DL (ref 0–199)
CHOLESTEROL/HDL RATIO: 2.9
CO2 SERPL-SCNC: 25 MMOL/L (ref 21–32)
CREAT SERPL-MCNC: 1.01 MG/DL (ref 0.5–1.3)
EGFRCR SERPLBLD CKD-EPI 2021: 82 ML/MIN/1.73M*2
EST. AVERAGE GLUCOSE BLD GHB EST-MCNC: 171 MG/DL
GLUCOSE SERPL-MCNC: 126 MG/DL (ref 74–99)
HBA1C MFR BLD: 7.6 %
HDLC SERPL-MCNC: 34.4 MG/DL
LDLC SERPL CALC-MCNC: 39 MG/DL
NON HDL CHOLESTEROL: 65 MG/DL (ref 0–149)
POTASSIUM SERPL-SCNC: 4.5 MMOL/L (ref 3.5–5.3)
PROT SERPL-MCNC: 7.2 G/DL (ref 6.4–8.2)
SODIUM SERPL-SCNC: 137 MMOL/L (ref 136–145)
TRIGL SERPL-MCNC: 127 MG/DL (ref 0–149)
TSH SERPL-ACNC: 1.86 MIU/L (ref 0.44–3.98)
VLDL: 25 MG/DL (ref 0–40)

## 2024-02-23 PROCEDURE — 83036 HEMOGLOBIN GLYCOSYLATED A1C: CPT

## 2024-02-23 PROCEDURE — 84443 ASSAY THYROID STIM HORMONE: CPT

## 2024-02-23 PROCEDURE — 80061 LIPID PANEL: CPT

## 2024-02-23 PROCEDURE — 80053 COMPREHEN METABOLIC PANEL: CPT

## 2024-02-27 ENCOUNTER — OFFICE VISIT (OUTPATIENT)
Dept: PRIMARY CARE | Facility: CLINIC | Age: 69
End: 2024-02-27
Payer: MEDICARE

## 2024-02-27 VITALS
WEIGHT: 220.9 LBS | SYSTOLIC BLOOD PRESSURE: 130 MMHG | BODY MASS INDEX: 30.93 KG/M2 | HEIGHT: 71 IN | DIASTOLIC BLOOD PRESSURE: 60 MMHG

## 2024-02-27 DIAGNOSIS — I10 PRIMARY HYPERTENSION: ICD-10-CM

## 2024-02-27 DIAGNOSIS — E13.9 OTHER SPECIFIED DIABETES MELLITUS WITHOUT COMPLICATION, WITHOUT LONG-TERM CURRENT USE OF INSULIN (MULTI): ICD-10-CM

## 2024-02-27 DIAGNOSIS — E03.9 HYPOTHYROIDISM, UNSPECIFIED TYPE: ICD-10-CM

## 2024-02-27 DIAGNOSIS — E78.00 HIGH CHOLESTEROL: ICD-10-CM

## 2024-02-27 DIAGNOSIS — N40.0 BENIGN PROSTATIC HYPERPLASIA, UNSPECIFIED WHETHER LOWER URINARY TRACT SYMPTOMS PRESENT: ICD-10-CM

## 2024-02-27 DIAGNOSIS — E66.3 OVERWEIGHT: Primary | ICD-10-CM

## 2024-02-27 PROCEDURE — 4010F ACE/ARB THERAPY RXD/TAKEN: CPT | Performed by: INTERNAL MEDICINE

## 2024-02-27 PROCEDURE — 3078F DIAST BP <80 MM HG: CPT | Performed by: INTERNAL MEDICINE

## 2024-02-27 PROCEDURE — 3075F SYST BP GE 130 - 139MM HG: CPT | Performed by: INTERNAL MEDICINE

## 2024-02-27 PROCEDURE — 3051F HG A1C>EQUAL 7.0%<8.0%: CPT | Performed by: INTERNAL MEDICINE

## 2024-02-27 PROCEDURE — 1126F AMNT PAIN NOTED NONE PRSNT: CPT | Performed by: INTERNAL MEDICINE

## 2024-02-27 PROCEDURE — 3048F LDL-C <100 MG/DL: CPT | Performed by: INTERNAL MEDICINE

## 2024-02-27 PROCEDURE — 1036F TOBACCO NON-USER: CPT | Performed by: INTERNAL MEDICINE

## 2024-02-27 PROCEDURE — 1159F MED LIST DOCD IN RCRD: CPT | Performed by: INTERNAL MEDICINE

## 2024-02-27 PROCEDURE — 99214 OFFICE O/P EST MOD 30 MIN: CPT | Performed by: INTERNAL MEDICINE

## 2024-02-27 RX ORDER — METOPROLOL SUCCINATE 100 MG/1
100 TABLET, EXTENDED RELEASE ORAL DAILY
Qty: 90 TABLET | Refills: 0 | Status: SHIPPED | OUTPATIENT
Start: 2024-02-27

## 2024-02-27 RX ORDER — LEVOTHYROXINE SODIUM 75 UG/1
75 TABLET ORAL DAILY
Qty: 90 TABLET | Refills: 0 | Status: SHIPPED | OUTPATIENT
Start: 2024-02-27

## 2024-02-27 RX ORDER — LISINOPRIL 20 MG/1
20 TABLET ORAL DAILY
Qty: 90 TABLET | Refills: 0 | Status: SHIPPED | OUTPATIENT
Start: 2024-02-27 | End: 2025-02-26

## 2024-02-27 RX ORDER — SPIRONOLACTONE AND HYDROCHLOROTHIAZIDE 25; 25 MG/1; MG/1
1 TABLET ORAL DAILY
Qty: 90 TABLET | Refills: 0 | Status: SHIPPED | OUTPATIENT
Start: 2024-02-27

## 2024-02-27 RX ORDER — TAMSULOSIN HYDROCHLORIDE 0.4 MG/1
0.4 CAPSULE ORAL DAILY
Qty: 90 CAPSULE | Refills: 0 | Status: SHIPPED | OUTPATIENT
Start: 2024-02-27 | End: 2024-08-25

## 2024-02-27 RX ORDER — AMLODIPINE BESYLATE 5 MG/1
5 TABLET ORAL DAILY
Qty: 90 TABLET | Refills: 0 | Status: SHIPPED | OUTPATIENT
Start: 2024-02-27

## 2024-02-27 RX ORDER — ATORVASTATIN CALCIUM 80 MG/1
80 TABLET, FILM COATED ORAL NIGHTLY
Qty: 90 TABLET | Refills: 0 | Status: SHIPPED | OUTPATIENT
Start: 2024-02-27

## 2024-02-27 ASSESSMENT — ENCOUNTER SYMPTOMS
LOSS OF SENSATION IN FEET: 0
DEPRESSION: 0
OCCASIONAL FEELINGS OF UNSTEADINESS: 0

## 2024-02-28 NOTE — PROGRESS NOTES
"Subjective   Patient ID: Michael Silva is a 68 y.o. male who presents for Follow-up (on various conditions.).    This gentleman today came here for follow-up on various conditions.  Overall, he is a happy person.  He told me that he was without the drug because of mix up with the insurance.  Now things are better.  That is why sugar is high.  He works very hard, but mostly sitting.    I have personally reviewed the patient's Past Medical History, Medications, Allergies, Social History, and Family History in the EMR.    Review of Systems   All other systems reviewed and are negative.    Objective   /60   Ht 1.803 m (5' 11\")   Wt 100 kg (220 lb 14.4 oz)   BMI 30.81 kg/m²     Physical Exam  Vitals reviewed.   Cardiovascular:      Heart sounds: Normal heart sounds, S1 normal and S2 normal. No murmur heard.     No friction rub.   Pulmonary:      Effort: Pulmonary effort is normal.      Breath sounds: Normal breath sounds and air entry.   Abdominal:      Palpations: There is no hepatomegaly, splenomegaly or mass.   Musculoskeletal:      Right lower leg: No edema.      Left lower leg: No edema.   Lymphadenopathy:      Lower Body: No right inguinal adenopathy. No left inguinal adenopathy.   Neurological:      Cranial Nerves: Cranial nerves 2-12 are intact.      Sensory: No sensory deficit.      Motor: Motor function is intact.      Deep Tendon Reflexes: Reflexes are normal and symmetric.     LAB WORK: Laboratory testing discussed.    Assessment/Plan   Problem List Items Addressed This Visit             ICD-10-CM       Cardiac and Vasculature    Hypertension I10    Relevant Medications    amLODIPine (Norvasc) 5 mg tablet    spironolacton-hydrochlorothiaz (Aldactazide) 25-25 mg tablet    lisinopril 20 mg tablet    metoprolol succinate XL (Toprol-XL) 100 mg 24 hr tablet       Endocrine/Metabolic    Diabetes mellitus (CMS/HCC) E11.9    Relevant Medications    semaglutide (Rybelsus) 14 mg tablet tablet    " empagliflozin (Jardiance) 25 mg    Other Relevant Orders    Hemoglobin A1C    Hypothyroidism E03.9    Relevant Medications    levothyroxine (Synthroid, Levoxyl) 75 mcg tablet    Other Relevant Orders    Thyroid Stimulating Hormone     Other Visit Diagnoses         Codes    Overweight    -  Primary E66.3    High cholesterol     E78.00    Relevant Medications    atorvastatin (Lipitor) 80 mg tablet    Other Relevant Orders    Comprehensive Metabolic Panel    Lipid Panel    Benign prostatic hyperplasia, unspecified whether lower urinary tract symptoms present     N40.0    Relevant Medications    tamsulosin (Flomax) 0.4 mg 24 hr capsule        1. Type 2 diabetes, uncontrolled diabetes.  Hemoglobin A1c is slightly high.  Diet, exercise.  He is going to see eye doctor.  2. Hypertension, okay.  3. Cholesterol, stable.  4. Thyroid, okay.  6. Overweight.  Diet, exercise.  7. BPH, on medication.  8. I reviewed his blood work, due in three months and also urged him at the same time we will try to do Medicare Wellness Visit.    Scribe Attestation  By signing my name below, I, Lam Toscano attest that this documentation has been prepared under the direction and in the presence of Radha Jessica MD.

## 2024-05-25 ENCOUNTER — LAB (OUTPATIENT)
Dept: LAB | Facility: LAB | Age: 69
End: 2024-05-25
Payer: MEDICARE

## 2024-05-25 DIAGNOSIS — E03.9 HYPOTHYROIDISM, UNSPECIFIED TYPE: ICD-10-CM

## 2024-05-25 DIAGNOSIS — E13.9 OTHER SPECIFIED DIABETES MELLITUS WITHOUT COMPLICATION, WITHOUT LONG-TERM CURRENT USE OF INSULIN (MULTI): ICD-10-CM

## 2024-05-25 DIAGNOSIS — E78.00 HIGH CHOLESTEROL: ICD-10-CM

## 2024-05-25 LAB
ALBUMIN SERPL BCP-MCNC: 4.5 G/DL (ref 3.4–5)
ALP SERPL-CCNC: 84 U/L (ref 33–136)
ALT SERPL W P-5'-P-CCNC: 23 U/L (ref 10–52)
ANION GAP SERPL CALC-SCNC: 14 MMOL/L (ref 10–20)
AST SERPL W P-5'-P-CCNC: 18 U/L (ref 9–39)
BILIRUB SERPL-MCNC: 0.7 MG/DL (ref 0–1.2)
BUN SERPL-MCNC: 26 MG/DL (ref 6–23)
CALCIUM SERPL-MCNC: 9.2 MG/DL (ref 8.6–10.6)
CHLORIDE SERPL-SCNC: 99 MMOL/L (ref 98–107)
CHOLEST SERPL-MCNC: 113 MG/DL (ref 0–199)
CHOLESTEROL/HDL RATIO: 3.7
CO2 SERPL-SCNC: 27 MMOL/L (ref 21–32)
CREAT SERPL-MCNC: 1.07 MG/DL (ref 0.5–1.3)
EGFRCR SERPLBLD CKD-EPI 2021: 76 ML/MIN/1.73M*2
EST. AVERAGE GLUCOSE BLD GHB EST-MCNC: 163 MG/DL
GLUCOSE SERPL-MCNC: 117 MG/DL (ref 74–99)
HBA1C MFR BLD: 7.3 %
HDLC SERPL-MCNC: 30.4 MG/DL
LDLC SERPL CALC-MCNC: 47 MG/DL
NON HDL CHOLESTEROL: 83 MG/DL (ref 0–149)
POTASSIUM SERPL-SCNC: 4.4 MMOL/L (ref 3.5–5.3)
PROT SERPL-MCNC: 7.3 G/DL (ref 6.4–8.2)
SODIUM SERPL-SCNC: 136 MMOL/L (ref 136–145)
TRIGL SERPL-MCNC: 177 MG/DL (ref 0–149)
TSH SERPL-ACNC: 2.66 MIU/L (ref 0.44–3.98)
VLDL: 35 MG/DL (ref 0–40)

## 2024-05-25 PROCEDURE — 36415 COLL VENOUS BLD VENIPUNCTURE: CPT

## 2024-05-25 PROCEDURE — 83036 HEMOGLOBIN GLYCOSYLATED A1C: CPT

## 2024-05-25 PROCEDURE — 84443 ASSAY THYROID STIM HORMONE: CPT

## 2024-05-25 PROCEDURE — 80061 LIPID PANEL: CPT

## 2024-05-25 PROCEDURE — 80053 COMPREHEN METABOLIC PANEL: CPT

## 2024-05-31 ENCOUNTER — OFFICE VISIT (OUTPATIENT)
Dept: PRIMARY CARE | Facility: CLINIC | Age: 69
End: 2024-05-31
Payer: MEDICARE

## 2024-05-31 VITALS
DIASTOLIC BLOOD PRESSURE: 68 MMHG | WEIGHT: 220 LBS | BODY MASS INDEX: 30.8 KG/M2 | HEIGHT: 71 IN | SYSTOLIC BLOOD PRESSURE: 124 MMHG

## 2024-05-31 DIAGNOSIS — I25.10 CORONARY ARTERY DISEASE, UNSPECIFIED VESSEL OR LESION TYPE, UNSPECIFIED WHETHER ANGINA PRESENT, UNSPECIFIED WHETHER NATIVE OR TRANSPLANTED HEART: ICD-10-CM

## 2024-05-31 DIAGNOSIS — I10 HYPERTENSION, UNSPECIFIED TYPE: ICD-10-CM

## 2024-05-31 DIAGNOSIS — E78.00 HIGH CHOLESTEROL: ICD-10-CM

## 2024-05-31 DIAGNOSIS — E03.9 HYPOTHYROIDISM, UNSPECIFIED TYPE: ICD-10-CM

## 2024-05-31 DIAGNOSIS — Z00.00 MEDICARE ANNUAL WELLNESS VISIT, INITIAL: Primary | ICD-10-CM

## 2024-05-31 DIAGNOSIS — E13.9 OTHER SPECIFIED DIABETES MELLITUS WITHOUT COMPLICATION, WITHOUT LONG-TERM CURRENT USE OF INSULIN (MULTI): ICD-10-CM

## 2024-05-31 PROCEDURE — 1157F ADVNC CARE PLAN IN RCRD: CPT | Performed by: INTERNAL MEDICINE

## 2024-05-31 PROCEDURE — G0439 PPPS, SUBSEQ VISIT: HCPCS | Performed by: INTERNAL MEDICINE

## 2024-05-31 PROCEDURE — 1170F FXNL STATUS ASSESSED: CPT | Performed by: INTERNAL MEDICINE

## 2024-05-31 PROCEDURE — 99213 OFFICE O/P EST LOW 20 MIN: CPT | Performed by: INTERNAL MEDICINE

## 2024-05-31 PROCEDURE — 1160F RVW MEDS BY RX/DR IN RCRD: CPT | Performed by: INTERNAL MEDICINE

## 2024-05-31 PROCEDURE — 1159F MED LIST DOCD IN RCRD: CPT | Performed by: INTERNAL MEDICINE

## 2024-05-31 PROCEDURE — 4010F ACE/ARB THERAPY RXD/TAKEN: CPT | Performed by: INTERNAL MEDICINE

## 2024-05-31 PROCEDURE — 3051F HG A1C>EQUAL 7.0%<8.0%: CPT | Performed by: INTERNAL MEDICINE

## 2024-05-31 PROCEDURE — 3074F SYST BP LT 130 MM HG: CPT | Performed by: INTERNAL MEDICINE

## 2024-05-31 PROCEDURE — 3078F DIAST BP <80 MM HG: CPT | Performed by: INTERNAL MEDICINE

## 2024-05-31 PROCEDURE — 3048F LDL-C <100 MG/DL: CPT | Performed by: INTERNAL MEDICINE

## 2024-05-31 ASSESSMENT — ENCOUNTER SYMPTOMS
DEPRESSION: 0
LOSS OF SENSATION IN FEET: 0
OCCASIONAL FEELINGS OF UNSTEADINESS: 0

## 2024-05-31 ASSESSMENT — PATIENT HEALTH QUESTIONNAIRE - PHQ9
2. FEELING DOWN, DEPRESSED OR HOPELESS: NOT AT ALL
SUM OF ALL RESPONSES TO PHQ9 QUESTIONS 1 AND 2: 0
1. LITTLE INTEREST OR PLEASURE IN DOING THINGS: NOT AT ALL

## 2024-05-31 ASSESSMENT — ACTIVITIES OF DAILY LIVING (ADL)
GROCERY_SHOPPING: INDEPENDENT
DRESSING: INDEPENDENT
BATHING: INDEPENDENT
DOING_HOUSEWORK: INDEPENDENT
TAKING_MEDICATION: INDEPENDENT
MANAGING_FINANCES: INDEPENDENT

## 2024-05-31 NOTE — PROGRESS NOTES
"Subjective   Patient ID: Michael Silva is a 69 y.o. male who presents for Medicare Annual Wellness Visit Initial.    It is always a delight to serve Mr. Silva.  Today he came here for Medicare Wellness.  I thanked him for that and follow-up on blood work and other conditions.  Overall, he is enjoying his retired life.  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  He came here for follow-up on various conditions.    IMMUNIZATION:  He does not want pneumonia and shingles right now.    HEALTH MAINTENANCE:  He does not want colonoscopy anymore.    CARDIAC:  He sees cardiologist.    SOCIAL HISTORY:  Wife is a legal power of .    I have personally reviewed the patient's Past Medical History, Medications, Allergies, Social History, and Family History in the EMR.    Review of Systems   All other systems reviewed and are negative.  The patient has never had a stroke.  Skin cancer.    Objective   /68   Ht 1.803 m (5' 11\")   Wt 99.8 kg (220 lb)   BMI 30.68 kg/m²     Physical Exam  Vitals reviewed.   Cardiovascular:      Heart sounds: Normal heart sounds, S1 normal and S2 normal. No murmur heard.     No friction rub.   Pulmonary:      Effort: Pulmonary effort is normal.      Breath sounds: Normal breath sounds and air entry.   Abdominal:      Palpations: There is no hepatomegaly, splenomegaly or mass.   Musculoskeletal:      Right lower leg: No edema.      Left lower leg: No edema.   Lymphadenopathy:      Lower Body: No right inguinal adenopathy. No left inguinal adenopathy.   Neurological:      Cranial Nerves: Cranial nerves 2-12 are intact.      Sensory: No sensory deficit.      Motor: Motor function is intact.      Deep Tendon Reflexes: Reflexes are normal and symmetric.     LAB WORK:  Laboratory testing done, discussed.    Assessment/Plan   Problem List Items Addressed This Visit             ICD-10-CM       Cardiac and Vasculature    CAD (coronary artery disease) I25.10    Hypertension I10       Endocrine/Metabolic    " Diabetes mellitus (Multi) E11.9    Relevant Orders    Hemoglobin A1C    Hypothyroidism E03.9    Relevant Orders    Thyroid Stimulating Hormone     Other Visit Diagnoses         Codes    Medicare annual wellness visit, initial    -  Primary Z00.00    High cholesterol     E78.00    Relevant Orders    Comprehensive Metabolic Panel    Lipid Panel        1. Medicare Wellness Visit, done.  2. The patient is not depressed, not suicidal.  3. The patient’s wife is legal power of .  The patient is full code.  4. Type 2 diabetes.  Hemoglobin A1c is very good.  5. Hypertension, excellent.  6. Cholesterol, excellent.  7. Thyroid.  Monitor.  8. Coronary artery disease.  He sees cardiologist.  9. Repeat blood work ordered.  10. Follow up in three months.  Always happy to serve him anytime sooner if necessary.    Scribe Attestation  By signing my name below, ISandy, Scribe attest that this documentation has been prepared under the direction and in the presence of Radha Jessica MD.

## 2024-07-08 DIAGNOSIS — I10 PRIMARY HYPERTENSION: ICD-10-CM

## 2024-07-08 RX ORDER — LISINOPRIL 20 MG/1
20 TABLET ORAL DAILY
Qty: 90 TABLET | Refills: 0 | Status: SHIPPED | OUTPATIENT
Start: 2024-07-08

## 2024-07-09 DIAGNOSIS — E03.9 HYPOTHYROIDISM, UNSPECIFIED TYPE: ICD-10-CM

## 2024-07-09 DIAGNOSIS — E78.00 HIGH CHOLESTEROL: ICD-10-CM

## 2024-07-09 DIAGNOSIS — N40.0 BENIGN PROSTATIC HYPERPLASIA, UNSPECIFIED WHETHER LOWER URINARY TRACT SYMPTOMS PRESENT: ICD-10-CM

## 2024-07-09 DIAGNOSIS — I10 PRIMARY HYPERTENSION: ICD-10-CM

## 2024-07-09 DIAGNOSIS — E13.9 OTHER SPECIFIED DIABETES MELLITUS WITHOUT COMPLICATION, WITHOUT LONG-TERM CURRENT USE OF INSULIN (MULTI): ICD-10-CM

## 2024-07-09 RX ORDER — AMLODIPINE BESYLATE 5 MG/1
5 TABLET ORAL DAILY
Qty: 90 TABLET | Refills: 0 | Status: SHIPPED | OUTPATIENT
Start: 2024-07-09

## 2024-07-09 RX ORDER — SPIRONOLACTONE AND HYDROCHLOROTHIAZIDE 25; 25 MG/1; MG/1
1 TABLET ORAL DAILY
Qty: 90 TABLET | Refills: 0 | Status: SHIPPED | OUTPATIENT
Start: 2024-07-09 | End: 2024-07-12 | Stop reason: ALTCHOICE

## 2024-07-09 RX ORDER — TAMSULOSIN HYDROCHLORIDE 0.4 MG/1
0.4 CAPSULE ORAL DAILY
Qty: 90 CAPSULE | Refills: 0 | Status: SHIPPED | OUTPATIENT
Start: 2024-07-09 | End: 2025-01-05

## 2024-07-09 RX ORDER — METOPROLOL SUCCINATE 100 MG/1
100 TABLET, EXTENDED RELEASE ORAL DAILY
Qty: 90 TABLET | Refills: 0 | Status: SHIPPED | OUTPATIENT
Start: 2024-07-09

## 2024-07-09 RX ORDER — ATORVASTATIN CALCIUM 80 MG/1
80 TABLET, FILM COATED ORAL NIGHTLY
Qty: 90 TABLET | Refills: 0 | Status: SHIPPED | OUTPATIENT
Start: 2024-07-09

## 2024-07-09 RX ORDER — LEVOTHYROXINE SODIUM 75 UG/1
75 TABLET ORAL DAILY
Qty: 90 TABLET | Refills: 0 | Status: SHIPPED | OUTPATIENT
Start: 2024-07-09

## 2024-07-12 ENCOUNTER — OFFICE VISIT (OUTPATIENT)
Dept: CARDIOLOGY | Facility: CLINIC | Age: 69
End: 2024-07-12
Payer: MEDICARE

## 2024-07-12 VITALS
HEIGHT: 71 IN | BODY MASS INDEX: 30.85 KG/M2 | WEIGHT: 220.4 LBS | DIASTOLIC BLOOD PRESSURE: 68 MMHG | HEART RATE: 64 BPM | SYSTOLIC BLOOD PRESSURE: 118 MMHG | OXYGEN SATURATION: 95 %

## 2024-07-12 DIAGNOSIS — I25.10 CAD (CORONARY ARTERY DISEASE): Primary | ICD-10-CM

## 2024-07-12 PROCEDURE — 1157F ADVNC CARE PLAN IN RCRD: CPT | Performed by: INTERNAL MEDICINE

## 2024-07-12 PROCEDURE — 3078F DIAST BP <80 MM HG: CPT | Performed by: INTERNAL MEDICINE

## 2024-07-12 PROCEDURE — 1126F AMNT PAIN NOTED NONE PRSNT: CPT | Performed by: INTERNAL MEDICINE

## 2024-07-12 PROCEDURE — 3048F LDL-C <100 MG/DL: CPT | Performed by: INTERNAL MEDICINE

## 2024-07-12 PROCEDURE — 3074F SYST BP LT 130 MM HG: CPT | Performed by: INTERNAL MEDICINE

## 2024-07-12 PROCEDURE — 3051F HG A1C>EQUAL 7.0%<8.0%: CPT | Performed by: INTERNAL MEDICINE

## 2024-07-12 PROCEDURE — 1159F MED LIST DOCD IN RCRD: CPT | Performed by: INTERNAL MEDICINE

## 2024-07-12 PROCEDURE — 4010F ACE/ARB THERAPY RXD/TAKEN: CPT | Performed by: INTERNAL MEDICINE

## 2024-07-12 PROCEDURE — 99214 OFFICE O/P EST MOD 30 MIN: CPT | Performed by: INTERNAL MEDICINE

## 2024-07-12 ASSESSMENT — PATIENT HEALTH QUESTIONNAIRE - PHQ9
1. LITTLE INTEREST OR PLEASURE IN DOING THINGS: NOT AT ALL
2. FEELING DOWN, DEPRESSED OR HOPELESS: NOT AT ALL
SUM OF ALL RESPONSES TO PHQ9 QUESTIONS 1 AND 2: 0

## 2024-07-12 ASSESSMENT — PAIN SCALES - GENERAL: PAINLEVEL: 0-NO PAIN

## 2024-07-12 ASSESSMENT — COLUMBIA-SUICIDE SEVERITY RATING SCALE - C-SSRS
2. HAVE YOU ACTUALLY HAD ANY THOUGHTS OF KILLING YOURSELF?: NO
1. IN THE PAST MONTH, HAVE YOU WISHED YOU WERE DEAD OR WISHED YOU COULD GO TO SLEEP AND NOT WAKE UP?: NO
6. HAVE YOU EVER DONE ANYTHING, STARTED TO DO ANYTHING, OR PREPARED TO DO ANYTHING TO END YOUR LIFE?: NO

## 2024-07-12 NOTE — PROGRESS NOTES
Primary Care Physician: Radha Jessica MD  Date of Visit: 07/12/2024  9:00 AM EDT  Location of visit: [facility] [place] [address]     Chief Complaint:   Chief Complaint   Patient presents with    Follow-up     Denies chest pain , dizziness, shortness of breath , and or edema/swelling     Coronary Artery Disease    Chest Pain    Hypertension    Hyperlipidemia     HPI / Summary:   Michael Silva is a 69 y.o. male presents for follow up       ROS    Medical History:   He has a past medical history of Diabetes mellitus (Multi), Foreign body in cornea, unspecified eye, initial encounter (10/09/2019), High cholesterol, Hypertension, Hypothyroid, Impacted cerumen, unspecified ear (10/22/2019), Other specified disorders of eustachian tube, right ear (11/05/2019), Otitis media, unspecified, unspecified ear (10/09/2019), and Personal history of other endocrine, nutritional and metabolic disease (06/11/2013).  Surgical Hx:   He has a past surgical history that includes Other surgical history (06/11/2013); Appendectomy (06/11/2013); CT angio head w and wo IV contrast (10/23/2019); and CT angio neck (10/23/2019).   Social Hx:   He reports that he has never smoked. He has never used smokeless tobacco. He reports that he does not drink alcohol. No history on file for drug use.  Family Hx:   His family history includes Diabetes in an other family member; Heart disease in an other family member; Hypertension in an other family member.   Allergies:  Allergies   Allergen Reactions    Bromocriptine Unknown     Outpatient Medications:  Current Outpatient Medications   Medication Instructions    amLODIPine (NORVASC) 5 mg, oral, Daily    aspirin 325 mg, oral, Daily    atorvastatin (LIPITOR) 80 mg, oral, Nightly    empagliflozin (JARDIANCE) 25 mg, oral, Daily    levothyroxine (SYNTHROID, LEVOXYL) 75 mcg, oral, Daily    lisinopril 20 mg, oral, Daily, as directed    metoprolol succinate XL (TOPROL-XL) 100 mg, oral, Daily    semaglutide (RYBELSUS) 14  mg, oral, Daily    spironolacton-hydrochlorothiaz (Aldactazide) 25-25 mg tablet 25 mg, oral, Daily    tamsulosin (FLOMAX) 0.4 mg, oral, Daily     Physical Exam:  There were no vitals filed for this visit.  Wt Readings from Last 5 Encounters:   05/31/24 99.8 kg (220 lb)   02/27/24 100 kg (220 lb 14.4 oz)   01/05/24 101 kg (223 lb 9.6 oz)   11/21/23 99.3 kg (219 lb)   08/29/23 100 kg (221 lb)     Physical Exam  JVP not elevated. Carotid impulses are 2+ without overlying bruit.   Chest exhibits fair to good air movement with completely clear breath sounds.   The cardiac rhythm is regular with no premature beats.   Normal S1 and S2. No gallop, murmur or rub, or click.   Abdomen is soft and benign without focal tenderness.   With no lower leg edema. The pedal pulses are intact.     Last Labs:  Lab on 05/25/2024   Component Date Value    Glucose 05/25/2024 117 (H)     Sodium 05/25/2024 136     Potassium 05/25/2024 4.4     Chloride 05/25/2024 99     Bicarbonate 05/25/2024 27     Anion Gap 05/25/2024 14     Urea Nitrogen 05/25/2024 26 (H)     Creatinine 05/25/2024 1.07     eGFR 05/25/2024 76     Calcium 05/25/2024 9.2     Albumin 05/25/2024 4.5     Alkaline Phosphatase 05/25/2024 84     Total Protein 05/25/2024 7.3     AST 05/25/2024 18     Bilirubin, Total 05/25/2024 0.7     ALT 05/25/2024 23     Cholesterol 05/25/2024 113     HDL-Cholesterol 05/25/2024 30.4     Cholesterol/HDL Ratio 05/25/2024 3.7     LDL Calculated 05/25/2024 47     VLDL 05/25/2024 35     Triglycerides 05/25/2024 177 (H)     Non HDL Cholesterol 05/25/2024 83     Hemoglobin A1C 05/25/2024 7.3 (H)     Estimated Average Glucose 05/25/2024 163     Thyroid Stimulating Horm* 05/25/2024 2.66    Lab on 02/23/2024   Component Date Value    Glucose 02/23/2024 126 (H)     Sodium 02/23/2024 137     Potassium 02/23/2024 4.5     Chloride 02/23/2024 100     Bicarbonate 02/23/2024 25     Anion Gap 02/23/2024 17     Urea Nitrogen 02/23/2024 22     Creatinine 02/23/2024  1.01     eGFR 02/23/2024 82     Calcium 02/23/2024 9.8     Albumin 02/23/2024 4.4     Alkaline Phosphatase 02/23/2024 84     Total Protein 02/23/2024 7.2     AST 02/23/2024 15     Bilirubin, Total 02/23/2024 0.7     ALT 02/23/2024 24     Thyroid Stimulating Horm* 02/23/2024 1.86     Cholesterol 02/23/2024 99     HDL-Cholesterol 02/23/2024 34.4     Cholesterol/HDL Ratio 02/23/2024 2.9     LDL Calculated 02/23/2024 39     VLDL 02/23/2024 25     Triglycerides 02/23/2024 127     Non HDL Cholesterol 02/23/2024 65     Hemoglobin A1C 02/23/2024 7.6 (H)     Estimated Average Glucose 02/23/2024 171         Assessment/Plan   1. CAD with PCI stent deployment to high-grade RCA stenosis, 09/06/2001. The patient is actually doing quite well, working hard, no complaints. He had a negative surveillance nuclear stress test on 3/7/2014. He will remain on his present therapy unchanged which includes aspirin 325 mg daily, metoprolol  mg daily, quinapril 40 mg daily, and spironolactone 25-HCTZ 25 mg daily. Recheck office in 6 months. Echo done 10/2019 showed EF 55-60% with mild hypokinesis of the inferoposterior wall and mild AV sclerosis. We will have him return in six months with pharmacologic nuclear stress testing.  The patient remains asymptomatic.  2. Hypertension. The systolic blood pressure is adequately controlled today. The patient will continue on current therapy amlodipine 5 mg daily daily along with the present higher dosages of metoprolol ER, quinapril, and aldactone.  Patient has maintained a relatively steady weight of 220 pounds.  Blood pressure is well within normal range and the patient will be allowed to discontinue his Aldactazide 25-25 mg daily.  3. Mixed dyslipidemia. Reasonable response to the atorvastatin 80 mg. The patient's blood work on 01/2020 included a cholesterol 122 LDL 56 HDL 35 triglycerides 155. TSH 2.96. The CBC and SMA panels were normal. Additional lab work from 9/26/2020 included cholesterol  118 LDL 57 HDL 36 triglyceride 128. He had additional lab work performed on 05/2022 with cholesterol 122 HDL 34 LDL 51 triglyceride 183.  Recent lipid panel remains satisfactory with cholesterol 113 LDL 47 HDL 30 triglyceride 177.  The patient remains on the atorvastatin 80 mg daily.  4. Type 2 diabetes. Diabetic control remains fair with recent glycohemoglobin being 6.9% when checked on 9/26/2020 and 8.1% when checked on 05/2022.  Patient is currently on Rybelsus.  Lab work from 5/25/2024 includes a normal estimate panel glycohemoglobin 7.3% and a TSH of 2.66.  5. Obesity.  6. History of obstructive sleep apnea. No longer using cpap since losing weight.  7. Positive family history of CAD.  8. Primary hypothyroidism.  9. Negative ABIs 08/2019.  10. Carotid vascular disease. This patient had an episode of transient visual loss involving the right eye in 10/2019. A CT angiogram of the head and neck showed atherosclerotic calcification involving the carotid arteries without significant luminal narrowing. There was also atherosclerotic calcification involving the intracranial portions of the bilateral internal carotid arteries without significant luminal narrowing.  11. Hx of covid-19 vaccine #1 and #2.          Orders:  No orders of the defined types were placed in this encounter.     Followup Appts:  No future appointments.        ____________________________________________________________  Brian Carlos MD  [facility]  Assistant Clinical Professor, [employer]  [facility]

## 2024-08-26 ENCOUNTER — LAB (OUTPATIENT)
Dept: LAB | Facility: LAB | Age: 69
End: 2024-08-26
Payer: MEDICARE

## 2024-08-26 DIAGNOSIS — E78.00 HIGH CHOLESTEROL: ICD-10-CM

## 2024-08-26 DIAGNOSIS — E13.9 OTHER SPECIFIED DIABETES MELLITUS WITHOUT COMPLICATION, WITHOUT LONG-TERM CURRENT USE OF INSULIN (MULTI): ICD-10-CM

## 2024-08-26 DIAGNOSIS — E03.9 HYPOTHYROIDISM, UNSPECIFIED TYPE: ICD-10-CM

## 2024-08-26 LAB
ALBUMIN SERPL BCP-MCNC: 4.3 G/DL (ref 3.4–5)
ALP SERPL-CCNC: 107 U/L (ref 33–136)
ALT SERPL W P-5'-P-CCNC: 19 U/L (ref 10–52)
ANION GAP SERPL CALC-SCNC: 15 MMOL/L (ref 10–20)
AST SERPL W P-5'-P-CCNC: 16 U/L (ref 9–39)
BILIRUB SERPL-MCNC: 0.8 MG/DL (ref 0–1.2)
BUN SERPL-MCNC: 19 MG/DL (ref 6–23)
CALCIUM SERPL-MCNC: 9.3 MG/DL (ref 8.6–10.6)
CHLORIDE SERPL-SCNC: 103 MMOL/L (ref 98–107)
CHOLEST SERPL-MCNC: 102 MG/DL (ref 0–199)
CHOLESTEROL/HDL RATIO: 3.3
CO2 SERPL-SCNC: 25 MMOL/L (ref 21–32)
CREAT SERPL-MCNC: 0.87 MG/DL (ref 0.5–1.3)
EGFRCR SERPLBLD CKD-EPI 2021: >90 ML/MIN/1.73M*2
EST. AVERAGE GLUCOSE BLD GHB EST-MCNC: 146 MG/DL
GLUCOSE SERPL-MCNC: 101 MG/DL (ref 74–99)
HBA1C MFR BLD: 6.7 %
HDLC SERPL-MCNC: 31.3 MG/DL
LDLC SERPL CALC-MCNC: 50 MG/DL
NON HDL CHOLESTEROL: 71 MG/DL (ref 0–149)
POTASSIUM SERPL-SCNC: 4.1 MMOL/L (ref 3.5–5.3)
PROT SERPL-MCNC: 7.2 G/DL (ref 6.4–8.2)
SODIUM SERPL-SCNC: 139 MMOL/L (ref 136–145)
TRIGL SERPL-MCNC: 106 MG/DL (ref 0–149)
TSH SERPL-ACNC: 2.12 MIU/L (ref 0.44–3.98)
VLDL: 21 MG/DL (ref 0–40)

## 2024-08-26 PROCEDURE — 83036 HEMOGLOBIN GLYCOSYLATED A1C: CPT

## 2024-08-26 PROCEDURE — 80053 COMPREHEN METABOLIC PANEL: CPT

## 2024-08-26 PROCEDURE — 36415 COLL VENOUS BLD VENIPUNCTURE: CPT

## 2024-08-26 PROCEDURE — 84443 ASSAY THYROID STIM HORMONE: CPT

## 2024-08-26 PROCEDURE — 80061 LIPID PANEL: CPT

## 2024-08-30 ENCOUNTER — OFFICE VISIT (OUTPATIENT)
Dept: PRIMARY CARE | Facility: CLINIC | Age: 69
End: 2024-08-30
Payer: MEDICARE

## 2024-08-30 VITALS
DIASTOLIC BLOOD PRESSURE: 82 MMHG | HEIGHT: 71 IN | SYSTOLIC BLOOD PRESSURE: 144 MMHG | BODY MASS INDEX: 31.22 KG/M2 | WEIGHT: 223 LBS

## 2024-08-30 DIAGNOSIS — E78.00 HIGH CHOLESTEROL: ICD-10-CM

## 2024-08-30 DIAGNOSIS — E03.9 HYPOTHYROIDISM, UNSPECIFIED TYPE: ICD-10-CM

## 2024-08-30 DIAGNOSIS — I10 PRIMARY HYPERTENSION: ICD-10-CM

## 2024-08-30 DIAGNOSIS — E13.9 OTHER SPECIFIED DIABETES MELLITUS WITHOUT COMPLICATION, WITHOUT LONG-TERM CURRENT USE OF INSULIN (MULTI): ICD-10-CM

## 2024-08-30 DIAGNOSIS — N40.0 BENIGN PROSTATIC HYPERPLASIA, UNSPECIFIED WHETHER LOWER URINARY TRACT SYMPTOMS PRESENT: ICD-10-CM

## 2024-08-30 DIAGNOSIS — Z12.5 PROSTATE CANCER SCREENING: Primary | ICD-10-CM

## 2024-08-30 PROCEDURE — 3008F BODY MASS INDEX DOCD: CPT | Performed by: INTERNAL MEDICINE

## 2024-08-30 PROCEDURE — 3048F LDL-C <100 MG/DL: CPT | Performed by: INTERNAL MEDICINE

## 2024-08-30 PROCEDURE — 3079F DIAST BP 80-89 MM HG: CPT | Performed by: INTERNAL MEDICINE

## 2024-08-30 PROCEDURE — 99213 OFFICE O/P EST LOW 20 MIN: CPT | Performed by: INTERNAL MEDICINE

## 2024-08-30 PROCEDURE — 3044F HG A1C LEVEL LT 7.0%: CPT | Performed by: INTERNAL MEDICINE

## 2024-08-30 PROCEDURE — 1157F ADVNC CARE PLAN IN RCRD: CPT | Performed by: INTERNAL MEDICINE

## 2024-08-30 PROCEDURE — 4010F ACE/ARB THERAPY RXD/TAKEN: CPT | Performed by: INTERNAL MEDICINE

## 2024-08-30 PROCEDURE — 3077F SYST BP >= 140 MM HG: CPT | Performed by: INTERNAL MEDICINE

## 2024-08-30 RX ORDER — TAMSULOSIN HYDROCHLORIDE 0.4 MG/1
0.4 CAPSULE ORAL DAILY
Qty: 90 CAPSULE | Refills: 0 | Status: SHIPPED | OUTPATIENT
Start: 2024-08-30 | End: 2025-02-26

## 2024-08-30 RX ORDER — AMLODIPINE BESYLATE 5 MG/1
5 TABLET ORAL DAILY
Qty: 90 TABLET | Refills: 0 | Status: SHIPPED | OUTPATIENT
Start: 2024-08-30

## 2024-08-30 RX ORDER — QUINAPRIL 40 MG/1
40 TABLET ORAL NIGHTLY
Qty: 90 TABLET | Refills: 0 | Status: SHIPPED | OUTPATIENT
Start: 2024-08-30

## 2024-08-30 RX ORDER — METOPROLOL SUCCINATE 100 MG/1
100 TABLET, EXTENDED RELEASE ORAL DAILY
Qty: 90 TABLET | Refills: 0 | Status: SHIPPED | OUTPATIENT
Start: 2024-08-30

## 2024-08-30 RX ORDER — LEVOTHYROXINE SODIUM 75 UG/1
75 TABLET ORAL DAILY
Qty: 90 TABLET | Refills: 0 | Status: SHIPPED | OUTPATIENT
Start: 2024-08-30

## 2024-08-30 RX ORDER — ATORVASTATIN CALCIUM 80 MG/1
80 TABLET, FILM COATED ORAL NIGHTLY
Qty: 90 TABLET | Refills: 0 | Status: SHIPPED | OUTPATIENT
Start: 2024-08-30

## 2024-08-30 ASSESSMENT — ENCOUNTER SYMPTOMS
OCCASIONAL FEELINGS OF UNSTEADINESS: 0
LOSS OF SENSATION IN FEET: 0
DEPRESSION: 0

## 2024-08-30 NOTE — PROGRESS NOTES
"Subjective   Patient ID: Michael Silva is a 69 y.o. male who presents for Follow-up.    This gentleman today came here for follow-up on various conditions.  He is enjoying his USP.  Appetite and weight are okay.  No problem.    I have personally reviewed the patient's Past Medical History, Medications, Allergies, Social History, and Family History in the EMR.    Review of Systems   All other systems reviewed and are negative.    Objective   /82   Ht 1.803 m (5' 11\")   Wt 101 kg (223 lb)   BMI 31.10 kg/m²     Physical Exam  Vitals reviewed.   Cardiovascular:      Heart sounds: Normal heart sounds, S1 normal and S2 normal. No murmur heard.     No friction rub.   Pulmonary:      Effort: Pulmonary effort is normal.      Breath sounds: Normal breath sounds and air entry.   Abdominal:      Palpations: There is no hepatomegaly, splenomegaly or mass.   Musculoskeletal:      Right lower leg: No edema.      Left lower leg: No edema.   Lymphadenopathy:      Lower Body: No right inguinal adenopathy. No left inguinal adenopathy.   Neurological:      Cranial Nerves: Cranial nerves 2-12 are intact.      Sensory: No sensory deficit.      Motor: Motor function is intact.      Deep Tendon Reflexes: Reflexes are normal and symmetric.     LAB WORK:  Laboratory testing discussed.    Assessment/Plan   Problem List Items Addressed This Visit             ICD-10-CM       Cardiac and Vasculature    Hypertension I10    Relevant Medications    metoprolol succinate XL (Toprol-XL) 100 mg 24 hr tablet    amLODIPine (Norvasc) 5 mg tablet    quinapril (AccupriL) 40 mg tablet       Endocrine/Metabolic    Diabetes mellitus (Multi) E11.9    Relevant Medications    empagliflozin (Jardiance) 25 mg    semaglutide (Rybelsus) 14 mg tablet tablet    Other Relevant Orders    Hemoglobin A1C    Hypothyroidism E03.9    Relevant Medications    levothyroxine (Synthroid, Levoxyl) 75 mcg tablet    Other Relevant Orders    Thyroid Stimulating " Hormone     Other Visit Diagnoses         Codes    Prostate cancer screening    -  Primary Z12.5    Benign prostatic hyperplasia, unspecified whether lower urinary tract symptoms present     N40.0    Relevant Medications    tamsulosin (Flomax) 0.4 mg 24 hr capsule    High cholesterol     E78.00    Relevant Medications    atorvastatin (Lipitor) 80 mg tablet    Other Relevant Orders    Comprehensive Metabolic Panel    Lipid Panel        1. Type 2 diabetes.  Hemoglobin A1c is good.  2. Hypertension, excellent.  3. Cholesterol, excellent.  4. Prostate health.  Monitor.  5. He does not want colonoscopy.  Turned down Cologuard.  6. I shall see him in December.  Happy to serve him anytime if necessary.    Scribe Attestation  By signing my name below, I, Lam Toscano attest that this documentation has been prepared under the direction and in the presence of Radha Jessica MD.

## 2024-09-16 ENCOUNTER — TELEPHONE (OUTPATIENT)
Dept: CARDIOLOGY | Facility: CLINIC | Age: 69
End: 2024-09-16
Payer: MEDICARE

## 2024-09-16 DIAGNOSIS — I10 HYPERTENSION: Primary | ICD-10-CM

## 2024-09-16 RX ORDER — LISINOPRIL 20 MG/1
20 TABLET ORAL DAILY
COMMUNITY
End: 2024-09-16 | Stop reason: SDUPTHER

## 2024-09-16 RX ORDER — LISINOPRIL 20 MG/1
20 TABLET ORAL DAILY
Qty: 90 TABLET | Refills: 3 | Status: SHIPPED | OUTPATIENT
Start: 2024-09-16 | End: 2025-09-16

## 2024-12-10 ENCOUNTER — LAB (OUTPATIENT)
Dept: LAB | Facility: LAB | Age: 69
End: 2024-12-10
Payer: MEDICARE

## 2024-12-10 DIAGNOSIS — E78.00 HIGH CHOLESTEROL: ICD-10-CM

## 2024-12-10 DIAGNOSIS — E13.9 OTHER SPECIFIED DIABETES MELLITUS WITHOUT COMPLICATION, WITHOUT LONG-TERM CURRENT USE OF INSULIN: ICD-10-CM

## 2024-12-10 DIAGNOSIS — E03.9 HYPOTHYROIDISM, UNSPECIFIED TYPE: ICD-10-CM

## 2024-12-10 LAB
ALBUMIN SERPL BCP-MCNC: 4.6 G/DL (ref 3.4–5)
ALP SERPL-CCNC: 112 U/L (ref 33–136)
ALT SERPL W P-5'-P-CCNC: 22 U/L (ref 10–52)
ANION GAP SERPL CALC-SCNC: 19 MMOL/L (ref 10–20)
AST SERPL W P-5'-P-CCNC: 17 U/L (ref 9–39)
BILIRUB SERPL-MCNC: 0.7 MG/DL (ref 0–1.2)
BUN SERPL-MCNC: 19 MG/DL (ref 6–23)
CALCIUM SERPL-MCNC: 9.2 MG/DL (ref 8.6–10.3)
CHLORIDE SERPL-SCNC: 98 MMOL/L (ref 98–107)
CHOLEST SERPL-MCNC: 105 MG/DL (ref 0–199)
CHOLESTEROL/HDL RATIO: 3.1
CO2 SERPL-SCNC: 28 MMOL/L (ref 21–32)
CREAT SERPL-MCNC: 0.87 MG/DL (ref 0.5–1.3)
EGFRCR SERPLBLD CKD-EPI 2021: >90 ML/MIN/1.73M*2
EST. AVERAGE GLUCOSE BLD GHB EST-MCNC: 163 MG/DL
GLUCOSE SERPL-MCNC: 122 MG/DL (ref 74–99)
HBA1C MFR BLD: 7.3 %
HDLC SERPL-MCNC: 33.4 MG/DL
LDLC SERPL CALC-MCNC: 51 MG/DL
NON HDL CHOLESTEROL: 72 MG/DL (ref 0–149)
POTASSIUM SERPL-SCNC: 4.5 MMOL/L (ref 3.5–5.3)
PROT SERPL-MCNC: 7.2 G/DL (ref 6.4–8.2)
SODIUM SERPL-SCNC: 140 MMOL/L (ref 136–145)
TRIGL SERPL-MCNC: 102 MG/DL (ref 0–149)
TSH SERPL-ACNC: 2.73 MIU/L (ref 0.44–3.98)
VLDL: 20 MG/DL (ref 0–40)

## 2024-12-10 PROCEDURE — 80053 COMPREHEN METABOLIC PANEL: CPT

## 2024-12-10 PROCEDURE — 84443 ASSAY THYROID STIM HORMONE: CPT

## 2024-12-10 PROCEDURE — 83036 HEMOGLOBIN GLYCOSYLATED A1C: CPT

## 2024-12-10 PROCEDURE — 80061 LIPID PANEL: CPT

## 2024-12-10 PROCEDURE — 36415 COLL VENOUS BLD VENIPUNCTURE: CPT

## 2024-12-14 ENCOUNTER — OFFICE VISIT (OUTPATIENT)
Dept: PRIMARY CARE | Facility: CLINIC | Age: 69
End: 2024-12-14
Payer: MEDICARE

## 2024-12-14 VITALS
BODY MASS INDEX: 31.22 KG/M2 | HEIGHT: 71 IN | SYSTOLIC BLOOD PRESSURE: 172 MMHG | DIASTOLIC BLOOD PRESSURE: 90 MMHG | WEIGHT: 223 LBS

## 2024-12-14 DIAGNOSIS — E13.9 OTHER SPECIFIED DIABETES MELLITUS WITHOUT COMPLICATION, WITHOUT LONG-TERM CURRENT USE OF INSULIN: ICD-10-CM

## 2024-12-14 DIAGNOSIS — E03.9 HYPOTHYROIDISM, UNSPECIFIED TYPE: Primary | ICD-10-CM

## 2024-12-14 DIAGNOSIS — N40.0 BENIGN PROSTATIC HYPERPLASIA, UNSPECIFIED WHETHER LOWER URINARY TRACT SYMPTOMS PRESENT: ICD-10-CM

## 2024-12-14 DIAGNOSIS — I10 PRIMARY HYPERTENSION: ICD-10-CM

## 2024-12-14 DIAGNOSIS — E78.00 HIGH CHOLESTEROL: ICD-10-CM

## 2024-12-14 DIAGNOSIS — Z12.5 PROSTATE CANCER SCREENING: ICD-10-CM

## 2024-12-14 PROCEDURE — 1159F MED LIST DOCD IN RCRD: CPT | Performed by: INTERNAL MEDICINE

## 2024-12-14 PROCEDURE — 3048F LDL-C <100 MG/DL: CPT | Performed by: INTERNAL MEDICINE

## 2024-12-14 PROCEDURE — 3008F BODY MASS INDEX DOCD: CPT | Performed by: INTERNAL MEDICINE

## 2024-12-14 PROCEDURE — 1157F ADVNC CARE PLAN IN RCRD: CPT | Performed by: INTERNAL MEDICINE

## 2024-12-14 PROCEDURE — 3051F HG A1C>EQUAL 7.0%<8.0%: CPT | Performed by: INTERNAL MEDICINE

## 2024-12-14 PROCEDURE — 99214 OFFICE O/P EST MOD 30 MIN: CPT | Performed by: INTERNAL MEDICINE

## 2024-12-14 PROCEDURE — 3080F DIAST BP >= 90 MM HG: CPT | Performed by: INTERNAL MEDICINE

## 2024-12-14 PROCEDURE — 4010F ACE/ARB THERAPY RXD/TAKEN: CPT | Performed by: INTERNAL MEDICINE

## 2024-12-14 PROCEDURE — 3077F SYST BP >= 140 MM HG: CPT | Performed by: INTERNAL MEDICINE

## 2024-12-14 RX ORDER — ATORVASTATIN CALCIUM 80 MG/1
80 TABLET, FILM COATED ORAL NIGHTLY
Qty: 90 TABLET | Refills: 1 | Status: SHIPPED | OUTPATIENT
Start: 2024-12-14

## 2024-12-14 RX ORDER — AMLODIPINE BESYLATE 5 MG/1
5 TABLET ORAL DAILY
Qty: 90 TABLET | Refills: 1 | Status: SHIPPED | OUTPATIENT
Start: 2024-12-14

## 2024-12-14 RX ORDER — TAMSULOSIN HYDROCHLORIDE 0.4 MG/1
0.4 CAPSULE ORAL DAILY
Qty: 90 CAPSULE | Refills: 1 | Status: SHIPPED | OUTPATIENT
Start: 2024-12-14 | End: 2025-06-12

## 2024-12-14 RX ORDER — METOPROLOL SUCCINATE 100 MG/1
100 TABLET, EXTENDED RELEASE ORAL DAILY
Qty: 90 TABLET | Refills: 1 | Status: SHIPPED | OUTPATIENT
Start: 2024-12-14

## 2024-12-14 NOTE — PROGRESS NOTES
"Subjective   Patient ID: Michael Silva is a 69 y.o. male who presents for Follow-up.    Michael Silva today came here for multiple medical issues.  Overall, he is a happy person.  He is still blaming Thanksgiving for his diabetes.  Appetite and weight are okay.  No problem.  He came here for follow-up on various conditions.    I have personally reviewed the patient's Past Medical History, Medications, Allergies, Social History, and Family History in the EMR.    Review of Systems   All other systems reviewed and are negative.    Objective   /90   Ht 1.803 m (5' 11\")   Wt 101 kg (223 lb)   BMI 31.10 kg/m²     Physical Exam  Vitals reviewed.   Cardiovascular:      Heart sounds: Normal heart sounds, S1 normal and S2 normal. No murmur heard.     No friction rub.   Pulmonary:      Effort: Pulmonary effort is normal.      Breath sounds: Normal breath sounds and air entry.   Abdominal:      Palpations: There is no hepatomegaly, splenomegaly or mass.   Musculoskeletal:      Right lower leg: No edema.      Left lower leg: No edema.   Lymphadenopathy:      Lower Body: No right inguinal adenopathy. No left inguinal adenopathy.   Neurological:      Cranial Nerves: Cranial nerves 2-12 are intact.      Sensory: No sensory deficit.      Motor: Motor function is intact.      Deep Tendon Reflexes: Reflexes are normal and symmetric.     LAB WORK:  Laboratory testing discussed.    Assessment/Plan   Problem List Items Addressed This Visit             ICD-10-CM       Cardiac and Vasculature    Hypertension I10    Relevant Orders    CBC    Comprehensive Metabolic Panel    Hemoglobin A1C    Lipid Panel    Thyroid Stimulating Hormone    Urinalysis with Reflex Culture and Microscopic       Endocrine/Metabolic    Diabetes mellitus (Multi) E11.9    Relevant Orders    CBC    Comprehensive Metabolic Panel    Hemoglobin A1C    Lipid Panel    Thyroid Stimulating Hormone    Urinalysis with Reflex Culture and Microscopic    Hypothyroidism " - Primary E03.9     Other Visit Diagnoses         Codes    Benign prostatic hyperplasia, unspecified whether lower urinary tract symptoms present     N40.0    Relevant Orders    CBC    Comprehensive Metabolic Panel    Hemoglobin A1C    Lipid Panel    Thyroid Stimulating Hormone    Urinalysis with Reflex Culture and Microscopic    High cholesterol     E78.00    Relevant Orders    CBC    Comprehensive Metabolic Panel    Hemoglobin A1C    Lipid Panel    Thyroid Stimulating Hormone    Urinalysis with Reflex Culture and Microscopic    Prostate cancer screening     Z12.5        1. Type 2 diabetes.  I urged him, he is willing to try metformin, low-dose, we will see diarrhea.  2. Hypertension, okay.  3. Cholesterol, excellent.  4. Thyroid.  Monitor.  5. Follow-up blood work in February.  Happy to see him anytime.  6. I urged him for diabetic eye checkup.  7. Prostate health.  PSA monitor.  8. Continue to follow.    Scribe Attestation  By signing my name below, IAmira Scribe attest that this documentation has been prepared under the direction and in the presence of Radha Jessica MD.   All medical record entries made by the scribe were personally dictated by me I have reviewed the chart and agree the record accurately reflects my personal performance of his history physical examination and management

## 2025-01-17 ENCOUNTER — OFFICE VISIT (OUTPATIENT)
Dept: CARDIOLOGY | Facility: CLINIC | Age: 70
End: 2025-01-17
Payer: MEDICARE

## 2025-01-17 VITALS
OXYGEN SATURATION: 95 % | BODY MASS INDEX: 31.19 KG/M2 | DIASTOLIC BLOOD PRESSURE: 76 MMHG | WEIGHT: 222.8 LBS | SYSTOLIC BLOOD PRESSURE: 142 MMHG | HEART RATE: 76 BPM | HEIGHT: 71 IN

## 2025-01-17 DIAGNOSIS — I25.118 CORONARY ARTERY DISEASE OF NATIVE ARTERY OF NATIVE HEART WITH STABLE ANGINA PECTORIS: Primary | ICD-10-CM

## 2025-01-17 PROCEDURE — 3077F SYST BP >= 140 MM HG: CPT | Performed by: INTERNAL MEDICINE

## 2025-01-17 PROCEDURE — 4010F ACE/ARB THERAPY RXD/TAKEN: CPT | Performed by: INTERNAL MEDICINE

## 2025-01-17 PROCEDURE — 99214 OFFICE O/P EST MOD 30 MIN: CPT | Performed by: INTERNAL MEDICINE

## 2025-01-17 PROCEDURE — 1036F TOBACCO NON-USER: CPT | Performed by: INTERNAL MEDICINE

## 2025-01-17 PROCEDURE — 1157F ADVNC CARE PLAN IN RCRD: CPT | Performed by: INTERNAL MEDICINE

## 2025-01-17 PROCEDURE — 3078F DIAST BP <80 MM HG: CPT | Performed by: INTERNAL MEDICINE

## 2025-01-17 PROCEDURE — 3008F BODY MASS INDEX DOCD: CPT | Performed by: INTERNAL MEDICINE

## 2025-01-17 PROCEDURE — 1126F AMNT PAIN NOTED NONE PRSNT: CPT | Performed by: INTERNAL MEDICINE

## 2025-01-17 PROCEDURE — 1160F RVW MEDS BY RX/DR IN RCRD: CPT | Performed by: INTERNAL MEDICINE

## 2025-01-17 PROCEDURE — 1159F MED LIST DOCD IN RCRD: CPT | Performed by: INTERNAL MEDICINE

## 2025-01-17 ASSESSMENT — PAIN SCALES - GENERAL: PAINLEVEL_OUTOF10: 0-NO PAIN

## 2025-01-17 NOTE — PROGRESS NOTES
Primary Care Physician: Radha Jessica MD  Date of Visit: 01/17/2025  8:45 AM EST  Location of visit: [facility] – [place] [address]     Chief Complaint:   Chief Complaint   Patient presents with    Follow-up    Coronary Artery Disease    Hypertension     HPI / Summary:   Michael Silva is a 69 y.o. male presents for follow up       ROS    Medical History:   He has a past medical history of Diabetes mellitus (Multi), Foreign body in cornea, unspecified eye, initial encounter (10/09/2019), High cholesterol, Hypertension, Hypothyroid, Impacted cerumen, unspecified ear (10/22/2019), Other specified disorders of eustachian tube, right ear (11/05/2019), Otitis media, unspecified, unspecified ear (10/09/2019), and Personal history of other endocrine, nutritional and metabolic disease (06/11/2013).  Surgical Hx:   He has a past surgical history that includes Other surgical history (06/11/2013); Appendectomy (06/11/2013); CT angio head w and wo IV contrast (10/23/2019); and CT angio neck (10/23/2019).   Social Hx:   He reports that he has never smoked. He has never used smokeless tobacco. He reports that he does not drink alcohol and does not use drugs.  Family Hx:   His family history includes Diabetes in an other family member; Heart disease in an other family member; Hypertension in an other family member.   Allergies:  Allergies   Allergen Reactions    Bromocriptine Unknown     Outpatient Medications:  Current Outpatient Medications   Medication Instructions    amLODIPine (NORVASC) 5 mg, oral, Daily    aspirin 325 mg, Daily    atorvastatin (LIPITOR) 80 mg, oral, Nightly    empagliflozin (JARDIANCE) 25 mg, oral, Daily    lisinopril 20 mg, oral, Daily    lisinopril 20 mg, oral, Daily    metoprolol succinate XL (TOPROL-XL) 100 mg, oral, Daily    multivitamin with minerals iron-free (Centrum Silver) 1 tablet, Daily    quinapril (ACCUPRIL) 40 mg, oral, Nightly    semaglutide (RYBELSUS) 14 mg, oral, Daily    Synthroid 75 mcg, oral, Daily  "   tamsulosin (FLOMAX) 0.4 mg, oral, Daily     Physical Exam:  Vitals:    01/17/25 0837   BP: 179/82   BP Location: Right arm   Patient Position: Sitting   BP Cuff Size: Adult   Pulse: 75   SpO2: 95%   Weight: 101 kg (222 lb 12.8 oz)   Height: 1.803 m (5' 11\")     Wt Readings from Last 5 Encounters:   01/17/25 101 kg (222 lb 12.8 oz)   12/14/24 101 kg (223 lb)   08/30/24 101 kg (223 lb)   07/12/24 100 kg (220 lb 6.4 oz)   05/31/24 99.8 kg (220 lb)     Physical Exam  JVP not elevated. Carotid impulses are 2+ without overlying bruit.   Chest exhibits fair to good air movement with completely clear breath sounds.   The cardiac rhythm is regular with no premature beats.   Normal S1 and S2. No gallop, murmur or rub, or click.   Abdomen is soft and benign without focal tenderness.   With no lower leg edema. The pedal pulses are intact.     Last Labs:  Lab on 12/10/2024   Component Date Value    Glucose 12/10/2024 122 (H)     Sodium 12/10/2024 140     Potassium 12/10/2024 4.5     Chloride 12/10/2024 98     Bicarbonate 12/10/2024 28     Anion Gap 12/10/2024 19     Urea Nitrogen 12/10/2024 19     Creatinine 12/10/2024 0.87     eGFR 12/10/2024 >90     Calcium 12/10/2024 9.2     Albumin 12/10/2024 4.6     Alkaline Phosphatase 12/10/2024 112     Total Protein 12/10/2024 7.2     AST 12/10/2024 17     Bilirubin, Total 12/10/2024 0.7     ALT 12/10/2024 22     Hemoglobin A1C 12/10/2024 7.3 (H)     Estimated Average Glucose 12/10/2024 163     Cholesterol 12/10/2024 105     HDL-Cholesterol 12/10/2024 33.4     Cholesterol/HDL Ratio 12/10/2024 3.1     LDL Calculated 12/10/2024 51     VLDL 12/10/2024 20     Triglycerides 12/10/2024 102     Non HDL Cholesterol 12/10/2024 72     Thyroid Stimulating Horm* 12/10/2024 2.73    Lab on 08/26/2024   Component Date Value    Glucose 08/26/2024 101 (H)     Sodium 08/26/2024 139     Potassium 08/26/2024 4.1     Chloride 08/26/2024 103     Bicarbonate 08/26/2024 25     Anion Gap 08/26/2024 15     " Urea Nitrogen 08/26/2024 19     Creatinine 08/26/2024 0.87     eGFR 08/26/2024 >90     Calcium 08/26/2024 9.3     Albumin 08/26/2024 4.3     Alkaline Phosphatase 08/26/2024 107     Total Protein 08/26/2024 7.2     AST 08/26/2024 16     Bilirubin, Total 08/26/2024 0.8     ALT 08/26/2024 19     Thyroid Stimulating Horm* 08/26/2024 2.12     Cholesterol 08/26/2024 102     HDL-Cholesterol 08/26/2024 31.3     Cholesterol/HDL Ratio 08/26/2024 3.3     LDL Calculated 08/26/2024 50     VLDL 08/26/2024 21     Triglycerides 08/26/2024 106     Non HDL Cholesterol 08/26/2024 71     Hemoglobin A1C 08/26/2024 6.7 (H)     Estimated Average Glucose 08/26/2024 146         Assessment/Plan   1. CAD with PCI stent deployment to high-grade RCA stenosis, 09/06/2001. The patient is actually doing quite well, working hard, no complaints. He had a negative surveillance nuclear stress test on 3/7/2014. He will remain on his present therapy unchanged which includes aspirin 325 mg daily, metoprolol  mg daily, quinapril 40 mg daily, and spironolactone 25-HCTZ 25 mg daily. Recheck office in 6 months. Echo done 10/2019 showed EF 55-60% with mild hypokinesis of the inferoposterior wall and mild AV sclerosis. We will have him return in six months with pharmacologic nuclear stress testing.  The patient did have a pharmacological nuclear stress test t 12/16/2022 which showed normal myocardial perfusion.  He patient remains asymptomatic.  2. Hypertension. The systolic blood pressure is adequately controlled today. The patient will continue on current therapy amlodipine 5 mg daily daily along with the present higher dosages of metoprolol ER, quinapril, and aldactone.  Patient has maintained a relatively steady weight of 220 pounds.  Blood pressure is well within normal range and the patient will be allowed to discontinue his Aldactazide 25-25 mg daily.  Follow-up blood pressure office visit 1/17/2025 well within normal range in the absence of the  previous Aldactazide.  He presently is lisinopril 20 mg daily Toprol- mg daily amlodipine 5 mg daily.  3. Mixed dyslipidemia. Reasonable response to the atorvastatin 80 mg. The patient's blood work on 01/2020 included a cholesterol 122 LDL 56 HDL 35 triglycerides 155. TSH 2.96. The CBC and SMA panels were normal. Additional lab work from 9/26/2020 included cholesterol 118 LDL 57 HDL 36 triglyceride 128. He had additional lab work performed on 05/2022 with cholesterol 122 HDL 34 LDL 51 triglyceride 183.  Recent lipid panel remains satisfactory with cholesterol 113 LDL 47 HDL 30 triglyceride 177.  Lab work from 12/10/2024 includes lipid panel cholesterol 105 LDL 51 HDL 33 triglyceride 102.  The patient remains on the atorvastatin 80 mg daily.  4. Type 2 diabetes. Diabetic control remains fair with recent glycohemoglobin being 6.9% when checked on 9/26/2020 and 8.1% when checked on 05/2022.  Patient is currently on Rybelsus.  Lab work from 5/25/2024 includes a normal estimate panel glycohemoglobin 7.3% and a TSH of 2.66.  Patient presently on both Rybelsus and Jardiance.  Lab work 12/10/2024 includes normal SMA panel with glucose 122 glycohemoglobin 7.3% TSH 2.73.  His weight is been stable at 222 pounds.  In the past remotely he had weighed over 300 pounds.  5. Obesity.  6. History of obstructive sleep apnea. No longer using cpap since losing weight.  7. Positive family history of CAD.  8. Primary hypothyroidism.  9. Negative ABIs 08/2019.  10. Carotid vascular disease. This patient had an episode of transient visual loss involving the right eye in 10/2019. A CT angiogram of the head and neck showed atherosclerotic calcification involving the carotid arteries without significant luminal narrowing. There was also atherosclerotic calcification involving the intracranial portions of the bilateral internal carotid arteries without significant luminal narrowing.  11. Hx of covid-19 vaccine #1 and #2.           Orders:  No orders of the defined types were placed in this encounter.     Followup Appts:  Future Appointments   Date Time Provider Department Center   1/17/2025  8:45 AM Brian Carlos MD [id number] [facility]           ____________________________________________________________  Brian Carlos MD  [facility]  Assistant Clinical Professor, [facility] [employer] [employer]  [facility]

## 2025-02-20 LAB
ALBUMIN SERPL-MCNC: 4.8 G/DL (ref 3.6–5.1)
ALP SERPL-CCNC: 115 U/L (ref 35–144)
ALT SERPL-CCNC: 21 U/L (ref 9–46)
ANION GAP SERPL CALCULATED.4IONS-SCNC: 15 MMOL/L (CALC) (ref 7–17)
APPEARANCE UR: CLEAR
AST SERPL-CCNC: 18 U/L (ref 10–35)
BACTERIA #/AREA URNS HPF: ABNORMAL /HPF
BACTERIA UR CULT: ABNORMAL
BILIRUB SERPL-MCNC: 0.9 MG/DL (ref 0.2–1.2)
BILIRUB UR QL STRIP: NEGATIVE
BUN SERPL-MCNC: 20 MG/DL (ref 7–25)
CALCIUM SERPL-MCNC: 9.4 MG/DL (ref 8.6–10.3)
CHLORIDE SERPL-SCNC: 101 MMOL/L (ref 98–110)
CHOLEST SERPL-MCNC: 110 MG/DL
CHOLEST/HDLC SERPL: 3.1 (CALC)
CO2 SERPL-SCNC: 24 MMOL/L (ref 20–32)
COLOR UR: YELLOW
CREAT SERPL-MCNC: 0.87 MG/DL (ref 0.7–1.35)
EGFRCR SERPLBLD CKD-EPI 2021: 95 ML/MIN/1.73M2
ERYTHROCYTE [DISTWIDTH] IN BLOOD BY AUTOMATED COUNT: 13.2 % (ref 11–15)
EST. AVERAGE GLUCOSE BLD GHB EST-MCNC: 163 MG/DL
EST. AVERAGE GLUCOSE BLD GHB EST-SCNC: 9 MMOL/L
GLUCOSE SERPL-MCNC: 107 MG/DL (ref 65–99)
GLUCOSE UR QL STRIP: ABNORMAL
HBA1C MFR BLD: 7.3 % OF TOTAL HGB
HCT VFR BLD AUTO: 52.6 % (ref 38.5–50)
HDLC SERPL-MCNC: 35 MG/DL
HGB BLD-MCNC: 17.6 G/DL (ref 13.2–17.1)
HGB UR QL STRIP: NEGATIVE
HYALINE CASTS #/AREA URNS LPF: ABNORMAL /LPF
KETONES UR QL STRIP: ABNORMAL
LDLC SERPL CALC-MCNC: 54 MG/DL (CALC)
LEUKOCYTE ESTERASE UR QL STRIP: NEGATIVE
MCH RBC QN AUTO: 29.2 PG (ref 27–33)
MCHC RBC AUTO-ENTMCNC: 33.5 G/DL (ref 32–36)
MCV RBC AUTO: 87.2 FL (ref 80–100)
NITRITE UR QL STRIP: NEGATIVE
NONHDLC SERPL-MCNC: 75 MG/DL (CALC)
PH UR STRIP: ABNORMAL [PH] (ref 5–8)
PLATELET # BLD AUTO: 242 THOUSAND/UL (ref 140–400)
PMV BLD REES-ECKER: 10.3 FL (ref 7.5–12.5)
POTASSIUM SERPL-SCNC: 4.2 MMOL/L (ref 3.5–5.3)
PROT SERPL-MCNC: 7.4 G/DL (ref 6.1–8.1)
PROT UR QL STRIP: NEGATIVE
RBC # BLD AUTO: 6.03 MILLION/UL (ref 4.2–5.8)
RBC #/AREA URNS HPF: ABNORMAL /HPF
SERVICE CMNT-IMP: ABNORMAL
SODIUM SERPL-SCNC: 140 MMOL/L (ref 135–146)
SP GR UR STRIP: 1.04 (ref 1–1.03)
SQUAMOUS #/AREA URNS HPF: ABNORMAL /HPF
TRIGL SERPL-MCNC: 125 MG/DL
TSH SERPL-ACNC: 1.66 MIU/L (ref 0.4–4.5)
WBC # BLD AUTO: 7.7 THOUSAND/UL (ref 3.8–10.8)
WBC #/AREA URNS HPF: ABNORMAL /HPF

## 2025-02-25 ENCOUNTER — APPOINTMENT (OUTPATIENT)
Dept: PRIMARY CARE | Facility: CLINIC | Age: 70
End: 2025-02-25
Payer: MEDICARE

## 2025-02-25 VITALS
WEIGHT: 217 LBS | BODY MASS INDEX: 30.38 KG/M2 | HEIGHT: 71 IN | DIASTOLIC BLOOD PRESSURE: 82 MMHG | SYSTOLIC BLOOD PRESSURE: 142 MMHG

## 2025-02-25 DIAGNOSIS — E78.00 ELEVATED LOW DENSITY LIPOPROTEIN (LDL) CHOLESTEROL LEVEL: ICD-10-CM

## 2025-02-25 DIAGNOSIS — E13.9 OTHER SPECIFIED DIABETES MELLITUS WITHOUT COMPLICATION, WITHOUT LONG-TERM CURRENT USE OF INSULIN: ICD-10-CM

## 2025-02-25 DIAGNOSIS — E03.9 HYPOTHYROIDISM, UNSPECIFIED TYPE: ICD-10-CM

## 2025-02-25 DIAGNOSIS — D75.1 POLYCYTHEMIA: Primary | ICD-10-CM

## 2025-02-25 DIAGNOSIS — E78.00 HIGH CHOLESTEROL: ICD-10-CM

## 2025-02-25 DIAGNOSIS — I10 PRIMARY HYPERTENSION: ICD-10-CM

## 2025-02-25 PROCEDURE — 1159F MED LIST DOCD IN RCRD: CPT | Performed by: INTERNAL MEDICINE

## 2025-02-25 PROCEDURE — 99214 OFFICE O/P EST MOD 30 MIN: CPT | Performed by: INTERNAL MEDICINE

## 2025-02-25 PROCEDURE — 3079F DIAST BP 80-89 MM HG: CPT | Performed by: INTERNAL MEDICINE

## 2025-02-25 PROCEDURE — 4010F ACE/ARB THERAPY RXD/TAKEN: CPT | Performed by: INTERNAL MEDICINE

## 2025-02-25 PROCEDURE — G2211 COMPLEX E/M VISIT ADD ON: HCPCS | Performed by: INTERNAL MEDICINE

## 2025-02-25 PROCEDURE — 1157F ADVNC CARE PLAN IN RCRD: CPT | Performed by: INTERNAL MEDICINE

## 2025-02-25 PROCEDURE — 3077F SYST BP >= 140 MM HG: CPT | Performed by: INTERNAL MEDICINE

## 2025-02-25 PROCEDURE — 3008F BODY MASS INDEX DOCD: CPT | Performed by: INTERNAL MEDICINE

## 2025-02-26 NOTE — PROGRESS NOTES
"Subjective   Patient ID: Michael Silva is a 69 y.o. male who presents for Follow-up.    This gentleman, Ricardo, who is a nonsmoker, came here for follow-up on various conditions.  Appetite and weight are okay.  No problem.  No weight loss.  ______.  No night sweats.  He works very hard, it is involving his hobby.  He is taking medication.  He is enjoying his [facility].  No side effects.    I have personally reviewed the patient's Past Medical History, Medications, Allergies, Social History, and Family History in the EMR.    Review of Systems   All other systems reviewed and are negative.    Objective   /82   Ht 1.803 m (5' 11\")   Wt 98.4 kg (217 lb)   BMI 30.27 kg/m²     Physical Exam  Vitals reviewed.   Cardiovascular:      Heart sounds: Normal heart sounds, S1 normal and S2 normal. No murmur heard.     No friction rub.   Pulmonary:      Effort: Pulmonary effort is normal.      Breath sounds: Normal breath sounds and air entry.   Abdominal:      Palpations: There is no hepatomegaly, splenomegaly or mass.   Musculoskeletal:      Right lower leg: No edema.      Left lower leg: No edema.   Lymphadenopathy:      Lower Body: No right inguinal adenopathy. No left inguinal adenopathy.   Neurological:      Cranial Nerves: Cranial nerves 2-12 are intact.      Sensory: No sensory deficit.      Motor: Motor function is intact.      Deep Tendon Reflexes: Reflexes are normal and symmetric.     LAB WORK:  Laboratory testing discussed.    Assessment/Plan   Problem List Items Addressed This Visit             ICD-10-CM    Diabetes mellitus (Multi) E11.9    Relevant Orders    Hemoglobin A1C    Elevated low density lipoprotein (LDL) cholesterol level E78.00    Relevant Orders    Referral To Hematology and Oncology    Hypertension I10    Relevant Orders    CBC    Urinalysis with Reflex Microscopic    Lactate dehydrogenase    Hypothyroidism E03.9    Relevant Orders    Thyroid Stimulating Hormone     Other Visit Diagnoses  "        Codes    Polycythemia    -  Primary D75.1    High cholesterol     E78.00    Relevant Orders    Comprehensive Metabolic Panel    Lipid Panel        1. Polycythemia ______ nonsmoker.  Refer to Hematology for evaluation.  I will repeat the blood work.  2. Type 2 diabetes.  Hemoglobin A1c is slightly up.  He is doing with diet, exercise, and medication.  At the moment, no insulin.  3. Hypertension, okay.  4. High cholesterol, stable.  5. Thyroid.  Monitor.  6. Skin.  Refer to [facility].  ______ moles and freckles.  I doubt that will cause the problem, but he will talk to Skin team.  7. Follow-up with me in two to three months.  8. I will refer to Hematologist.    Lam Attestation  By signing my name below, I, Lam Toscano attest that this documentation has been prepared under the direction and in the presence of Radha Jessica MD.     All medical record entries made by the scribe were personally dictated by me I have reviewed the chart and agree the record accurately reflects my personal performance of his history physical examination and management

## 2025-04-17 ENCOUNTER — OFFICE VISIT (OUTPATIENT)
Dept: HEMATOLOGY/ONCOLOGY | Facility: CLINIC | Age: 70
End: 2025-04-17
Payer: MEDICARE

## 2025-04-17 VITALS
BODY MASS INDEX: 30.71 KG/M2 | HEART RATE: 66 BPM | WEIGHT: 214.51 LBS | DIASTOLIC BLOOD PRESSURE: 78 MMHG | TEMPERATURE: 97.5 F | RESPIRATION RATE: 16 BRPM | HEIGHT: 70 IN | SYSTOLIC BLOOD PRESSURE: 134 MMHG | OXYGEN SATURATION: 96 %

## 2025-04-17 DIAGNOSIS — E78.00 ELEVATED LOW DENSITY LIPOPROTEIN (LDL) CHOLESTEROL LEVEL: ICD-10-CM

## 2025-04-17 PROCEDURE — 3008F BODY MASS INDEX DOCD: CPT | Performed by: INTERNAL MEDICINE

## 2025-04-17 PROCEDURE — 99204 OFFICE O/P NEW MOD 45 MIN: CPT | Performed by: INTERNAL MEDICINE

## 2025-04-17 PROCEDURE — 4010F ACE/ARB THERAPY RXD/TAKEN: CPT | Performed by: INTERNAL MEDICINE

## 2025-04-17 PROCEDURE — 1157F ADVNC CARE PLAN IN RCRD: CPT | Performed by: INTERNAL MEDICINE

## 2025-04-17 PROCEDURE — 3078F DIAST BP <80 MM HG: CPT | Performed by: INTERNAL MEDICINE

## 2025-04-17 PROCEDURE — 99214 OFFICE O/P EST MOD 30 MIN: CPT | Performed by: INTERNAL MEDICINE

## 2025-04-17 PROCEDURE — 1159F MED LIST DOCD IN RCRD: CPT | Performed by: INTERNAL MEDICINE

## 2025-04-17 PROCEDURE — 3075F SYST BP GE 130 - 139MM HG: CPT | Performed by: INTERNAL MEDICINE

## 2025-04-17 PROCEDURE — 1036F TOBACCO NON-USER: CPT | Performed by: INTERNAL MEDICINE

## 2025-04-17 PROCEDURE — 1126F AMNT PAIN NOTED NONE PRSNT: CPT | Performed by: INTERNAL MEDICINE

## 2025-04-17 RX ORDER — FLUOROURACIL 50 MG/G
CREAM TOPICAL 2 TIMES DAILY
COMMUNITY

## 2025-04-17 SDOH — ECONOMIC STABILITY: FOOD INSECURITY: WITHIN THE PAST 12 MONTHS, THE FOOD YOU BOUGHT JUST DIDN'T LAST AND YOU DIDN'T HAVE MONEY TO GET MORE.: NEVER TRUE

## 2025-04-17 SDOH — ECONOMIC STABILITY: FOOD INSECURITY: WITHIN THE PAST 12 MONTHS, YOU WORRIED THAT YOUR FOOD WOULD RUN OUT BEFORE YOU GOT THE MONEY TO BUY MORE.: NEVER TRUE

## 2025-04-17 ASSESSMENT — ENCOUNTER SYMPTOMS
CARDIOVASCULAR NEGATIVE: 1
CONSTITUTIONAL NEGATIVE: 1
RESPIRATORY NEGATIVE: 1
GASTROINTESTINAL NEGATIVE: 1

## 2025-04-17 ASSESSMENT — PAIN SCALES - GENERAL: PAINLEVEL_OUTOF10: 0-NO PAIN

## 2025-04-17 ASSESSMENT — COLUMBIA-SUICIDE SEVERITY RATING SCALE - C-SSRS
1. IN THE PAST MONTH, HAVE YOU WISHED YOU WERE DEAD OR WISHED YOU COULD GO TO SLEEP AND NOT WAKE UP?: NO
2. HAVE YOU ACTUALLY HAD ANY THOUGHTS OF KILLING YOURSELF?: NO
6. HAVE YOU EVER DONE ANYTHING, STARTED TO DO ANYTHING, OR PREPARED TO DO ANYTHING TO END YOUR LIFE?: NO

## 2025-04-17 NOTE — PROGRESS NOTES
"Patient ID: Michael Silva is a 69 y.o. male.  Referring Physician: Radha Jessica MD  [address] [place] [address]  [facility], [address]  [place] [address]  Primary Care Provider: Radha Jessica MD  Visit Type:  Initial Visit     Subjective    HPI  Referred because of high hematocrit.  Review of the patient's serial hematocrit and CBC notes that this is a solitary reading.  On direct questioning however patient has lived in [employer] system and in a wood-burning stove environment.  Therefore carbon monoxide concentrations may play a part in patient's current symptomatology.  Clinically however I will defer intervening and have him follow-up with PCP for yearly and 6 monthly CBCs only if there is persistence in this value abnormal value will intervention be indicated.  Patient is to return in 1 year.  Review of Systems   Constitutional: Negative.    HENT:  Negative.     Respiratory: Negative.     Cardiovascular: Negative.    Gastrointestinal: Negative.         Objective   BSA: 2.2 meters squared  /78 (BP Location: Right arm, Patient Position: Sitting, BP Cuff Size: Adult)   Pulse 66   Temp 36.4 °C (97.5 °F) (Temporal)   Resp 16   Ht (S) 1.785 m (5' 10.28\")   Wt 97.3 kg (214 lb 8.1 oz)   SpO2 96%   BMI 30.54 kg/m²      has a past medical history of Diabetes mellitus (Multi), Foreign body in cornea, unspecified eye, initial encounter (10/09/2019), High cholesterol, Hypertension, Hypothyroid, Impacted cerumen, unspecified ear (10/22/2019), Other specified disorders of eustachian tube, right ear (11/05/2019), Otitis media, unspecified, unspecified ear (10/09/2019), and Personal history of other endocrine, nutritional and metabolic disease (06/11/2013).   has a past surgical history that includes Other surgical history (06/11/2013); Appendectomy (06/11/2013); CT angio head w and wo IV contrast (10/23/2019); and CT angio neck (10/23/2019).  Family History[1]  Oncology History    No history " existsAmanda Rodriguez RUBEN Silva  reports that he has never smoked. He has never used smokeless tobacco.  He  reports no history of alcohol use.  He  reports no history of drug use.    Physical Exam  Constitutional:       Appearance: Normal appearance.   HENT:      Head: Normocephalic and atraumatic.   Eyes:      Extraocular Movements: Extraocular movements intact.      Pupils: Pupils are equal, round, and reactive to light.   Neurological:      Mental Status: He is alert.         WBC   Date/Time Value Ref Range Status   12/13/2022 08:58 AM 8.7 4.4 - 11.3 x10E9/L Final   05/03/2022 08:31 AM 7.4 4.4 - 11.3 x10E9/L Final   10/02/2021 08:30 AM 8.9 4.4 - 11.3 x10E9/L Final     WHITE BLOOD CELL COUNT   Date/Time Value Ref Range Status   02/19/2025 08:24 AM 7.7 3.8 - 10.8 Thousand/uL Final     nRBC   Date Value Ref Range Status   12/13/2022 0.0 0.0 - 0.0 /100 WBC Final   05/03/2022 0.0 0.0 - 0.0 /100 WBC Final   10/02/2021 0.0 0.0 - 0.0 /100 WBC Final     RBC   Date Value Ref Range Status   12/13/2022 5.86 4.50 - 5.90 x10E12/L Final   05/03/2022 5.68 4.50 - 5.90 x10E12/L Final   10/02/2021 5.57 4.50 - 5.90 x10E12/L Final     RED BLOOD CELL COUNT   Date Value Ref Range Status   02/19/2025 6.03 (H) 4.20 - 5.80 Million/uL Final     Hemoglobin   Date Value Ref Range Status   12/13/2022 16.8 13.5 - 17.5 g/dL Final   05/03/2022 16.8 13.5 - 17.5 g/dL Final   10/02/2021 16.6 13.5 - 17.5 g/dL Final     HEMOGLOBIN   Date Value Ref Range Status   02/19/2025 17.6 (H) 13.2 - 17.1 g/dL Final     Hematocrit   Date Value Ref Range Status   12/13/2022 51.7 41.0 - 52.0 % Final   05/03/2022 49.8 41.0 - 52.0 % Final   10/02/2021 48.4 41.0 - 52.0 % Final     HEMATOCRIT   Date Value Ref Range Status   02/19/2025 52.6 (H) 38.5 - 50.0 % Final     MCV   Date/Time Value Ref Range Status   02/19/2025 08:24 AM 87.2 80.0 - 100.0 fL Final   12/13/2022 08:58 AM 88 80 - 100 fL Final   05/03/2022 08:31 AM 88 80 - 100 fL Final   10/02/2021 08:30 AM 87 80 -  100 fL Final     MCH   Date/Time Value Ref Range Status   02/19/2025 08:24 AM 29.2 27.0 - 33.0 pg Final     MCHC   Date/Time Value Ref Range Status   02/19/2025 08:24 AM 33.5 32.0 - 36.0 g/dL Final     Comment:     For adults, a slight decrease in the calculated MCHC  value (in the range of 30 to 32 g/dL) is most likely  not clinically significant; however, it should be  interpreted with caution in correlation with other  red cell parameters and the patient's clinical  condition.     12/13/2022 08:58 AM 32.5 32.0 - 36.0 g/dL Final   05/03/2022 08:31 AM 33.7 32.0 - 36.0 g/dL Final   10/02/2021 08:30 AM 34.3 32.0 - 36.0 g/dL Final     RDW   Date/Time Value Ref Range Status   02/19/2025 08:24 AM 13.2 11.0 - 15.0 % Final   12/13/2022 08:58 AM 12.3 11.5 - 14.5 % Final   05/03/2022 08:31 AM 12.6 11.5 - 14.5 % Final   10/02/2021 08:30 AM 12.5 11.5 - 14.5 % Final     Platelets   Date/Time Value Ref Range Status   12/13/2022 08:58  150 - 450 x10E9/L Final   05/03/2022 08:31  150 - 450 x10E9/L Final   10/02/2021 08:30  150 - 450 x10E9/L Final     PLATELET COUNT   Date/Time Value Ref Range Status   02/19/2025 08:24  140 - 400 Thousand/uL Final     MPV   Date/Time Value Ref Range Status   02/19/2025 08:24 AM 10.3 7.5 - 12.5 fL Final     Neutrophils %   Date/Time Value Ref Range Status   12/13/2022 08:58 AM 64.9 40.0 - 80.0 % Final   06/28/2021 08:26 AM 64.1 40.0 - 80.0 % Final   09/26/2020 08:57 AM 62.1 40.0 - 80.0 % Final     Immature Granulocytes %, Automated   Date/Time Value Ref Range Status   12/13/2022 08:58 AM 0.2 0.0 - 0.9 % Final     Comment:      Immature Granulocyte Count (IG) includes promyelocytes,    myelocytes and metamyelocytes but does not include bands.   Percent differential counts (%) should be interpreted in the   context of the absolute cell counts (cells/L).     06/28/2021 08:26 AM 0.4 0.0 - 0.9 % Final     Comment:      Immature Granulocyte Count (IG) includes promyelocytes,     "myelocytes and metamyelocytes but does not include bands.   Percent differential counts (%) should be interpreted in the   context of the absolute cell counts (cells/L).     09/26/2020 08:57 AM 0.3 0.0 - 0.9 % Final     Comment:      Immature Granulocyte Count (IG) includes promyelocytes,    myelocytes and metamyelocytes but does not include bands.   Percent differential counts (%) should be interpreted in the   context of the absolute cell counts (cells/L).       Lymphocytes %   Date/Time Value Ref Range Status   12/13/2022 08:58 AM 25.0 13.0 - 44.0 % Final   06/28/2021 08:26 AM 23.8 13.0 - 44.0 % Final   09/26/2020 08:57 AM 26.7 13.0 - 44.0 % Final     Monocytes %   Date/Time Value Ref Range Status   12/13/2022 08:58 AM 7.6 2.0 - 10.0 % Final   06/28/2021 08:26 AM 9.2 2.0 - 10.0 % Final   09/26/2020 08:57 AM 7.2 2.0 - 10.0 % Final     Eosinophils %   Date/Time Value Ref Range Status   12/13/2022 08:58 AM 1.5 0.0 - 6.0 % Final   06/28/2021 08:26 AM 1.8 0.0 - 6.0 % Final   09/26/2020 08:57 AM 2.7 0.0 - 6.0 % Final     Basophils %   Date/Time Value Ref Range Status   12/13/2022 08:58 AM 0.8 0.0 - 2.0 % Final   06/28/2021 08:26 AM 0.7 0.0 - 2.0 % Final   09/26/2020 08:57 AM 1.0 0.0 - 2.0 % Final     Neutrophils Absolute   Date/Time Value Ref Range Status   12/13/2022 08:58 AM 5.65 1.20 - 7.70 x10E9/L Final   06/28/2021 08:26 AM 5.38 1.20 - 7.70 x10E9/L Final   09/26/2020 08:57 AM 5.59 1.20 - 7.70 x10E9/L Final     No results found for: \"IGABSOL\"  Lymphocytes Absolute   Date/Time Value Ref Range Status   12/13/2022 08:58 AM 2.18 1.20 - 4.80 x10E9/L Final   06/28/2021 08:26 AM 2.00 1.20 - 4.80 x10E9/L Final   09/26/2020 08:57 AM 2.41 1.20 - 4.80 x10E9/L Final     Monocytes Absolute   Date/Time Value Ref Range Status   12/13/2022 08:58 AM 0.66 0.10 - 1.00 x10E9/L Final   06/28/2021 08:26 AM 0.77 0.10 - 1.00 x10E9/L Final   09/26/2020 08:57 AM 0.65 0.10 - 1.00 x10E9/L Final     Eosinophils Absolute   Date/Time Value Ref " Range Status   12/13/2022 08:58 AM 0.13 0.00 - 0.70 x10E9/L Final   06/28/2021 08:26 AM 0.15 0.00 - 0.70 x10E9/L Final   09/26/2020 08:57 AM 0.24 0.00 - 0.70 x10E9/L Final     Basophils Absolute   Date/Time Value Ref Range Status   12/13/2022 08:58 AM 0.07 0.00 - 0.10 x10E9/L Final   06/28/2021 08:26 AM 0.06 0.00 - 0.10 x10E9/L Final   09/26/2020 08:57 AM 0.09 0.00 - 0.10 x10E9/L Final       Assessment/Plan      Solitary elevation of HCT :  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 RTC if it persists. RTC in one year :     Diagnoses and all orders for this visit:  Elevated low density lipoprotein (LDL) cholesterol level  -     Referral To Hematology and Oncology  -     Clinic Appointment Request Follow Up (review labs); Future           Roxie Narvaez MD                              [1]   Family History  Problem Relation Name Age of Onset    Heart disease Other      Diabetes Other      Hypertension Other

## 2025-04-17 NOTE — PATIENT INSTRUCTIONS
Today you met with your hematologist/oncologist.  Recent labs were discussed and questions answered.  Scheduling orders were placed.  While we appreciate that you verbalized understanding, if any questions arise after leaving, please do not hesitate to call the office to discuss.  [phone] Taty Castaneda.  Dr Narvaez will see you in one year.

## 2025-05-28 LAB
ALBUMIN SERPL-MCNC: 4.8 G/DL (ref 3.6–5.1)
ALP SERPL-CCNC: 112 U/L (ref 35–144)
ALT SERPL-CCNC: 23 U/L (ref 9–46)
ANION GAP SERPL CALCULATED.4IONS-SCNC: 11 MMOL/L (CALC) (ref 7–17)
APPEARANCE UR: CLEAR
AST SERPL-CCNC: 18 U/L (ref 10–35)
BACTERIA #/AREA URNS HPF: ABNORMAL /HPF
BILIRUB SERPL-MCNC: 0.7 MG/DL (ref 0.2–1.2)
BILIRUB UR QL STRIP: NEGATIVE
BUN SERPL-MCNC: 17 MG/DL (ref 7–25)
CALCIUM SERPL-MCNC: 9.2 MG/DL (ref 8.6–10.3)
CAOX CRY #/AREA URNS HPF: ABNORMAL /HPF
CHLORIDE SERPL-SCNC: 102 MMOL/L (ref 98–110)
CHOLEST SERPL-MCNC: 112 MG/DL
CHOLEST/HDLC SERPL: 2.9 (CALC)
CO2 SERPL-SCNC: 26 MMOL/L (ref 20–32)
COLOR UR: YELLOW
CREAT SERPL-MCNC: 0.79 MG/DL (ref 0.7–1.35)
EGFRCR SERPLBLD CKD-EPI 2021: 97 ML/MIN/1.73M2
ERYTHROCYTE [DISTWIDTH] IN BLOOD BY AUTOMATED COUNT: 12.9 % (ref 11–15)
EST. AVERAGE GLUCOSE BLD GHB EST-MCNC: 146 MG/DL
EST. AVERAGE GLUCOSE BLD GHB EST-SCNC: 8.1 MMOL/L
GLUCOSE SERPL-MCNC: 118 MG/DL (ref 65–99)
GLUCOSE UR QL STRIP: ABNORMAL
HBA1C MFR BLD: 6.7 %
HCT VFR BLD AUTO: 51 % (ref 38.5–50)
HDLC SERPL-MCNC: 38 MG/DL
HGB BLD-MCNC: 16.6 G/DL (ref 13.2–17.1)
HGB UR QL STRIP: NEGATIVE
HYALINE CASTS #/AREA URNS LPF: ABNORMAL /LPF
KETONES UR QL STRIP: ABNORMAL
LDH SERPL P TO L-CCNC: 136 U/L (ref 120–250)
LDLC SERPL CALC-MCNC: 56 MG/DL (CALC)
LEUKOCYTE ESTERASE UR QL STRIP: NEGATIVE
MCH RBC QN AUTO: 28.4 PG (ref 27–33)
MCHC RBC AUTO-ENTMCNC: 32.5 G/DL (ref 32–36)
MCV RBC AUTO: 87.3 FL (ref 80–100)
NITRITE UR QL STRIP: NEGATIVE
NONHDLC SERPL-MCNC: 74 MG/DL (CALC)
PH UR STRIP: 5.5 [PH] (ref 5–8)
PLATELET # BLD AUTO: 296 THOUSAND/UL (ref 140–400)
PMV BLD REES-ECKER: 10.1 FL (ref 7.5–12.5)
POTASSIUM SERPL-SCNC: 4 MMOL/L (ref 3.5–5.3)
PROT SERPL-MCNC: 7.5 G/DL (ref 6.1–8.1)
PROT UR QL STRIP: ABNORMAL
RBC # BLD AUTO: 5.84 MILLION/UL (ref 4.2–5.8)
RBC #/AREA URNS HPF: ABNORMAL /HPF
SERVICE CMNT-IMP: ABNORMAL
SODIUM SERPL-SCNC: 139 MMOL/L (ref 135–146)
SP GR UR STRIP: 1.04 (ref 1–1.03)
SQUAMOUS #/AREA URNS HPF: ABNORMAL /HPF
TRIGL SERPL-MCNC: 98 MG/DL
TSH SERPL-ACNC: 2.66 MIU/L (ref 0.4–4.5)
WBC # BLD AUTO: 9.6 THOUSAND/UL (ref 3.8–10.8)
WBC #/AREA URNS HPF: ABNORMAL /HPF

## 2025-05-30 ENCOUNTER — OFFICE VISIT (OUTPATIENT)
Dept: PRIMARY CARE | Facility: CLINIC | Age: 70
End: 2025-05-30
Payer: MEDICARE

## 2025-05-30 VITALS
DIASTOLIC BLOOD PRESSURE: 76 MMHG | WEIGHT: 210.4 LBS | SYSTOLIC BLOOD PRESSURE: 134 MMHG | HEIGHT: 71 IN | BODY MASS INDEX: 29.46 KG/M2

## 2025-05-30 DIAGNOSIS — Z79.4 TYPE 2 DIABETES MELLITUS WITHOUT COMPLICATION, WITH LONG-TERM CURRENT USE OF INSULIN: ICD-10-CM

## 2025-05-30 DIAGNOSIS — Z12.5 PROSTATE CANCER SCREENING: ICD-10-CM

## 2025-05-30 DIAGNOSIS — E78.00 HIGH CHOLESTEROL: Primary | ICD-10-CM

## 2025-05-30 DIAGNOSIS — Z00.00 PHYSICAL EXAM, ROUTINE: ICD-10-CM

## 2025-05-30 DIAGNOSIS — I10 PRIMARY HYPERTENSION: ICD-10-CM

## 2025-05-30 DIAGNOSIS — E11.9 TYPE 2 DIABETES MELLITUS WITHOUT COMPLICATION, WITH LONG-TERM CURRENT USE OF INSULIN: ICD-10-CM

## 2025-05-30 DIAGNOSIS — D75.1 POLYCYTHEMIA: ICD-10-CM

## 2025-05-30 DIAGNOSIS — E13.9 OTHER SPECIFIED DIABETES MELLITUS WITHOUT COMPLICATION, WITHOUT LONG-TERM CURRENT USE OF INSULIN: ICD-10-CM

## 2025-05-30 DIAGNOSIS — E03.9 HYPOTHYROIDISM, UNSPECIFIED TYPE: ICD-10-CM

## 2025-05-31 NOTE — PROGRESS NOTES
"Subjective   Patient ID: Michael Silva is a 70 y.o. male who presents for Follow-up (Multiple medical issues).    Mr. Silva today came here for multiple medical issues.  From his wild tour of [place] he is back.  Overall he is doing okay.  Appetite and weight are okay.  No problem.  He came for follow-up on various conditions.    I have personally reviewed the patient's Past Medical History, Medications, Allergies, Social History, and Family History in the EMR.    Review of Systems   All other systems reviewed and are negative.    Objective   /76   Ht 1.803 m (5' 11\")   Wt 95.4 kg (210 lb 6.4 oz)   BMI 29.34 kg/m²     Physical Exam  Vitals reviewed.   Cardiovascular:      Heart sounds: Normal heart sounds, S1 normal and S2 normal. No murmur heard.     No friction rub.   Pulmonary:      Effort: Pulmonary effort is normal.      Breath sounds: Normal breath sounds and air entry.   Abdominal:      Palpations: There is no hepatomegaly, splenomegaly or mass.   Musculoskeletal:      Right lower leg: No edema.      Left lower leg: No edema.   Lymphadenopathy:      Lower Body: No right inguinal adenopathy. No left inguinal adenopathy.   Neurological:      Cranial Nerves: Cranial nerves 2-12 are intact.      Sensory: No sensory deficit.      Motor: Motor function is intact.      Deep Tendon Reflexes: Reflexes are normal and symmetric.     LAB WORK: Laboratory testing discussed.    Assessment/Plan   Problem List Items Addressed This Visit           ICD-10-CM    Diabetes mellitus (Multi) E11.9    Relevant Orders    Albumin-Creatinine Ratio, Urine Random    Comprehensive Metabolic Panel    Hemoglobin A1C    Hypertension I10    Hypothyroidism E03.9    Relevant Orders    Thyroid Stimulating Hormone     Other Visit Diagnoses         Codes      High cholesterol    -  Primary E78.00    Relevant Orders    Lipid Panel      Physical exam, routine     Z00.00    Relevant Orders    Prostate Specific Antigen, Screen      " Polycythemia     D75.1      Prostate cancer screening     Z12.5        1. Polycythemia.  Numbers are much better.  LDH okay.  Reassured.  2. Hypertension, okay.  3. High cholesterol, stable.  4. Type 2 diabetes.  Hemoglobin A1c is good.  5. Thyroid.  Monitor.  6. Prostate health.  PSA okay.  7. Follow-up appointment with me in three months.  Always happy to see him anytime sooner if necessary.    Scribe Attestation  By signing my name below, I, Nayely Toscano attest that this documentation has been prepared under the direction and in the presence of Radha Jessica MD.     All medical record entries made by the nayely were personally dictated by me I have reviewed the chart and agree the record accurately reflects my personal performance of his history physical examination and management

## 2025-07-13 NOTE — PROGRESS NOTES
Primary Care Physician: Radha Jessica MD  Date of Visit: 07/18/2025  8:45 AM EDT  Location of visit: [facility] [place] [address]     Chief Complaint:   No chief complaint on file.    HPI / Summary:   Michael Silva is a 70 y.o. male presents for follow up       ROS    Medical History:   He has a past medical history of Diabetes mellitus (Multi), Foreign body in cornea, unspecified eye, initial encounter (10/09/2019), High cholesterol, Hypertension, Hypothyroid, Impacted cerumen, unspecified ear (10/22/2019), Other specified disorders of eustachian tube, right ear (11/05/2019), Otitis media, unspecified, unspecified ear (10/09/2019), and Personal history of other endocrine, nutritional and metabolic disease (06/11/2013).  Surgical Hx:   He has a past surgical history that includes Other surgical history (06/11/2013); Appendectomy (06/11/2013); CT angio head w and wo IV contrast (10/23/2019); and CT angio neck (10/23/2019).   Social Hx:   He reports that he has never smoked. He has never used smokeless tobacco. He reports that he does not drink alcohol and does not use drugs.  Family Hx:   His family history includes Diabetes in an other family member; Heart disease in an other family member; Hypertension in an other family member.   Allergies:  Allergies   Allergen Reactions    Bromocriptine Unknown     Outpatient Medications:  Current Outpatient Medications   Medication Instructions    amLODIPine (NORVASC) 5 mg, oral, Daily    aspirin 325 mg, Daily    atorvastatin (LIPITOR) 80 mg, oral, Nightly    fluorouracil (Efudex) 5 % cream cream Topical, 2 times daily    Jardiance 25 mg, oral, Daily    lisinopril 20 mg, oral, Daily    metoprolol succinate XL (TOPROL-XL) 100 mg, oral, Daily    multivitamin with minerals iron-free (Centrum Silver) 1 tablet, Daily    quinapril (ACCUPRIL) 40 mg, oral, Nightly    Rybelsus 14 mg, oral, Daily    Synthroid 75 mcg, oral, Daily    tamsulosin (FLOMAX) 0.4 mg, oral, Daily     Physical  Exam:  There were no vitals filed for this visit.    Wt Readings from Last 5 Encounters:   05/30/25 95.4 kg (210 lb 6.4 oz)   04/17/25 97.3 kg (214 lb 8.1 oz)   02/25/25 98.4 kg (217 lb)   01/17/25 101 kg (222 lb 12.8 oz)   12/14/24 101 kg (223 lb)     Physical Exam  JVP not elevated. Carotid impulses are 2+ without overlying bruit.   Chest exhibits fair to good air movement with completely clear breath sounds.   The cardiac rhythm is regular with no premature beats.   Normal S1 and S2. No gallop, murmur or rub, or click.   Abdomen is soft and benign without focal tenderness.   With no lower leg edema. The pedal pulses are intact.     Last Labs:  Office Visit on 02/25/2025   Component Date Value    WHITE BLOOD CELL COUNT 05/27/2025 9.6     RED BLOOD CELL COUNT 05/27/2025 5.84 (H)     HEMOGLOBIN 05/27/2025 16.6     HEMATOCRIT 05/27/2025 51.0 (H)     MCV 05/27/2025 87.3     MCH 05/27/2025 28.4     MCHC 05/27/2025 32.5     RDW 05/27/2025 12.9     PLATELET COUNT 05/27/2025 296     MPV 05/27/2025 10.1     GLUCOSE 05/27/2025 118 (H)     UREA NITROGEN (BUN) 05/27/2025 17     CREATININE 05/27/2025 0.79     EGFR 05/27/2025 97     SODIUM 05/27/2025 139     POTASSIUM 05/27/2025 4.0     CHLORIDE 05/27/2025 102     CARBON DIOXIDE 05/27/2025 26     ELECTROLYTE BALANCE 05/27/2025 11     CALCIUM 05/27/2025 9.2     PROTEIN, TOTAL 05/27/2025 7.5     ALBUMIN 05/27/2025 4.8     BILIRUBIN, TOTAL 05/27/2025 0.7     ALKALINE PHOSPHATASE 05/27/2025 112     AST 05/27/2025 18     ALT 05/27/2025 23     CHOLESTEROL, TOTAL 05/27/2025 112     HDL CHOLESTEROL 05/27/2025 38 (L)     TRIGLYCERIDES 05/27/2025 98     LDL-CHOLESTEROL 05/27/2025 56     CHOL/HDLC RATIO 05/27/2025 2.9     NON HDL CHOLESTEROL 05/27/2025 74     TSH 05/27/2025 2.66     COLOR 05/27/2025 YELLOW     APPEARANCE 05/27/2025 CLEAR     SPECIFIC GRAVITY 05/27/2025 1.043 (H)     PH 05/27/2025 5.5     GLUCOSE 05/27/2025 3+ (A)     BILIRUBIN 05/27/2025 NEGATIVE     KETONES 05/27/2025 1+  (A)     OCCULT BLOOD 05/27/2025 NEGATIVE     PROTEIN 05/27/2025 TRACE (A)     NITRITE 05/27/2025 NEGATIVE     LEUKOCYTE ESTERASE 05/27/2025 NEGATIVE     WBC 05/27/2025 0-5     RBC 05/27/2025 NONE SEEN     SQUAMOUS EPITHELIAL CELLS 05/27/2025 0-5     BACTERIA 05/27/2025 NONE SEEN     CALCIUM OXALATE CRYSTALS 05/27/2025 MODERATE (A)     HYALINE CAST 05/27/2025 NONE SEEN     NOTE 05/27/2025      HEMOGLOBIN A1c 05/27/2025 6.7 (H)     eAG (mg/dL) 05/27/2025 146     eAG (mmol/L) 05/27/2025 8.1     LD 05/27/2025 136    Orders Only on 02/19/2025   Component Date Value    CHOLESTEROL, TOTAL 02/19/2025 110     HDL CHOLESTEROL 02/19/2025 35 (L)     TRIGLYCERIDES 02/19/2025 125     LDL-CHOLESTEROL 02/19/2025 54     CHOL/HDLC RATIO 02/19/2025 3.1     NON HDL CHOLESTEROL 02/19/2025 75     GLUCOSE 02/19/2025 107 (H)     UREA NITROGEN (BUN) 02/19/2025 20     CREATININE 02/19/2025 0.87     EGFR 02/19/2025 95     SODIUM 02/19/2025 140     POTASSIUM 02/19/2025 4.2     CHLORIDE 02/19/2025 101     CARBON DIOXIDE 02/19/2025 24     ELECTROLYTE BALANCE 02/19/2025 15     CALCIUM 02/19/2025 9.4     PROTEIN, TOTAL 02/19/2025 7.4     ALBUMIN 02/19/2025 4.8     BILIRUBIN, TOTAL 02/19/2025 0.9     ALKALINE PHOSPHATASE 02/19/2025 115     AST 02/19/2025 18     ALT 02/19/2025 21     WHITE BLOOD CELL COUNT 02/19/2025 7.7     RED BLOOD CELL COUNT 02/19/2025 6.03 (H)     HEMOGLOBIN 02/19/2025 17.6 (H)     HEMATOCRIT 02/19/2025 52.6 (H)     MCV 02/19/2025 87.2     MCH 02/19/2025 29.2     MCHC 02/19/2025 33.5     RDW 02/19/2025 13.2     PLATELET COUNT 02/19/2025 242     MPV 02/19/2025 10.3     COLOR 02/19/2025 YELLOW     APPEARANCE 02/19/2025 CLEAR     SPECIFIC GRAVITY 02/19/2025 1.038 (H)     PH 02/19/2025 < OR = 5.0     GLUCOSE 02/19/2025 3+ (A)     BILIRUBIN 02/19/2025 NEGATIVE     KETONES 02/19/2025 1+ (A)     OCCULT BLOOD 02/19/2025 NEGATIVE     PROTEIN 02/19/2025 NEGATIVE     NITRITE 02/19/2025 NEGATIVE     LEUKOCYTE ESTERASE 02/19/2025 NEGATIVE      WBC 02/19/2025 NONE SEEN     RBC 02/19/2025 NONE SEEN     SQUAMOUS EPITHELIAL CELLS 02/19/2025 NONE SEEN     BACTERIA 02/19/2025 NONE SEEN     HYALINE CAST 02/19/2025 NONE SEEN     NOTE 02/19/2025      REFLEXIVE URINE CULTURE 02/19/2025      TSH 02/19/2025 1.66     HEMOGLOBIN A1c 02/19/2025 7.3 (H)     eAG (mg/dL) 02/19/2025 163     eAG (mmol/L) 02/19/2025 9.0         Assessment/Plan   1. CAD with PCI stent deployment to high-grade RCA stenosis, 09/06/2001. The patient is actually doing quite well, working hard, no complaints. He had a negative surveillance nuclear stress test on 3/7/2014. He will remain on his present therapy unchanged which includes aspirin 325 mg daily, metoprolol  mg daily, quinapril 40 mg daily, and spironolactone 25-HCTZ 25 mg daily. Recheck office in 6 months. Echo done 10/2019 showed EF 55-60% with mild hypokinesis of the inferoposterior wall and mild AV sclerosis. We will have him return in six months with pharmacologic nuclear stress testing.  The patient did have a pharmacological nuclear stress test t 12/16/2022 which showed normal myocardial perfusion.  He patient remains asymptomatic.  2. Hypertension. The systolic blood pressure is adequately controlled today. The patient will continue on current therapy amlodipine 5 mg daily daily along with the present higher dosages of metoprolol ER, quinapril, and aldactone.  Patient has maintained a relatively steady weight of 220 pounds.  Blood pressure is well within normal range and the patient will be allowed to discontinue his Aldactazide 25-25 mg daily.  Follow-up blood pressure office visit 1/17/2025 well within normal range in the absence of the previous Aldactazide.  He presently is lisinopril 20 mg daily Toprol- mg daily amlodipine 5 mg daily.  3. Mixed dyslipidemia. Reasonable response to the atorvastatin 80 mg. The patient's blood work on 01/2020 included a cholesterol 122 LDL 56 HDL 35 triglycerides 155. TSH 2.96. The  CBC and SMA panels were normal. Additional lab work from 9/26/2020 included cholesterol 118 LDL 57 HDL 36 triglyceride 128. He had additional lab work performed on 05/2022 with cholesterol 122 HDL 34 LDL 51 triglyceride 183.  Recent lipid panel remains satisfactory with cholesterol 113 LDL 47 HDL 30 triglyceride 177.  Lab work from 12/10/2024 includes lipid panel cholesterol 105 LDL 51 HDL 33 triglyceride 102.  The patient remains on the atorvastatin 80 mg daily.  4. Type 2 diabetes. Diabetic control remains fair with recent glycohemoglobin being 6.9% when checked on 9/26/2020 and 8.1% when checked on 05/2022.  Patient is currently on Rybelsus.  Lab work from 5/25/2024 includes a normal estimate panel glycohemoglobin 7.3% and a TSH of 2.66.  Patient presently on both Rybelsus and Jardiance.  Lab work 12/10/2024 includes normal SMA panel with glucose 122 glycohemoglobin 7.3% TSH 2.73.  His weight is been stable at 222 pounds.  In the past remotely he had weighed over 300 pounds.  5. Obesity.  6. History of obstructive sleep apnea. No longer using cpap since losing weight.  7. Positive family history of CAD.  8. Primary hypothyroidism.  9. Negative ABIs 08/2019.  10. Carotid vascular disease. This patient had an episode of transient visual loss involving the right eye in 10/2019. A CT angiogram of the head and neck showed atherosclerotic calcification involving the carotid arteries without significant luminal narrowing. There was also atherosclerotic calcification involving the intracranial portions of the bilateral internal carotid arteries without significant luminal narrowing.  11. Hx of covid-19 vaccine #1 and #2.          Orders:  No orders of the defined types were placed in this encounter.     Followup Appts:  Future Appointments   Date Time Provider Department Center   7/18/2025  8:45 AM Brian Carlos MD [id number] [facility]   4/16/2026  3:00 PM Roxie Narvaez MD [id number] [facility]            ____________________________________________________________  MD [facility]  Assistant Clinical Professor, Health system

## 2025-07-18 ENCOUNTER — HOSPITAL ENCOUNTER (OUTPATIENT)
Dept: RADIOLOGY | Facility: CLINIC | Age: 70
Discharge: HOME | End: 2025-07-18
Payer: MEDICARE

## 2025-07-18 ENCOUNTER — OFFICE VISIT (OUTPATIENT)
Dept: CARDIOLOGY | Facility: CLINIC | Age: 70
End: 2025-07-18
Payer: MEDICARE

## 2025-07-18 VITALS
DIASTOLIC BLOOD PRESSURE: 76 MMHG | HEIGHT: 71 IN | OXYGEN SATURATION: 96 % | BODY MASS INDEX: 29.9 KG/M2 | HEART RATE: 72 BPM | WEIGHT: 213.6 LBS | SYSTOLIC BLOOD PRESSURE: 168 MMHG

## 2025-07-18 DIAGNOSIS — M54.50 LOW BACK PAIN, UNSPECIFIED BACK PAIN LATERALITY, UNSPECIFIED CHRONICITY, UNSPECIFIED WHETHER SCIATICA PRESENT: ICD-10-CM

## 2025-07-18 DIAGNOSIS — M79.89 LEG SWELLING: Primary | ICD-10-CM

## 2025-07-18 PROCEDURE — 3008F BODY MASS INDEX DOCD: CPT | Performed by: INTERNAL MEDICINE

## 2025-07-18 PROCEDURE — 1159F MED LIST DOCD IN RCRD: CPT | Performed by: INTERNAL MEDICINE

## 2025-07-18 PROCEDURE — 72110 X-RAY EXAM L-2 SPINE 4/>VWS: CPT | Performed by: RADIOLOGY

## 2025-07-18 PROCEDURE — G2211 COMPLEX E/M VISIT ADD ON: HCPCS | Performed by: INTERNAL MEDICINE

## 2025-07-18 PROCEDURE — 72110 X-RAY EXAM L-2 SPINE 4/>VWS: CPT

## 2025-07-18 PROCEDURE — 3078F DIAST BP <80 MM HG: CPT | Performed by: INTERNAL MEDICINE

## 2025-07-18 PROCEDURE — 3077F SYST BP >= 140 MM HG: CPT | Performed by: INTERNAL MEDICINE

## 2025-07-18 PROCEDURE — 1126F AMNT PAIN NOTED NONE PRSNT: CPT | Performed by: INTERNAL MEDICINE

## 2025-07-18 PROCEDURE — 1160F RVW MEDS BY RX/DR IN RCRD: CPT | Performed by: INTERNAL MEDICINE

## 2025-07-18 PROCEDURE — 4010F ACE/ARB THERAPY RXD/TAKEN: CPT | Performed by: INTERNAL MEDICINE

## 2025-07-18 PROCEDURE — 99212 OFFICE O/P EST SF 10 MIN: CPT

## 2025-07-18 PROCEDURE — 99214 OFFICE O/P EST MOD 30 MIN: CPT | Performed by: INTERNAL MEDICINE

## 2025-07-18 ASSESSMENT — PATIENT HEALTH QUESTIONNAIRE - PHQ9
SUM OF ALL RESPONSES TO PHQ9 QUESTIONS 1 AND 2: 0
1. LITTLE INTEREST OR PLEASURE IN DOING THINGS: NOT AT ALL
2. FEELING DOWN, DEPRESSED OR HOPELESS: NOT AT ALL

## 2025-07-18 ASSESSMENT — PAIN SCALES - GENERAL: PAINLEVEL_OUTOF10: 0-NO PAIN

## 2025-08-30 LAB
ALBUMIN SERPL-MCNC: 4.5 G/DL (ref 3.6–5.1)
ALBUMIN/CREAT UR: NORMAL
ALP SERPL-CCNC: 113 U/L (ref 35–144)
ALT SERPL-CCNC: 21 U/L (ref 9–46)
ANION GAP SERPL CALCULATED.4IONS-SCNC: 12 MMOL/L (CALC) (ref 7–17)
AST SERPL-CCNC: 17 U/L (ref 10–35)
BILIRUB SERPL-MCNC: 0.8 MG/DL (ref 0.2–1.2)
BUN SERPL-MCNC: 14 MG/DL (ref 7–25)
CALCIUM SERPL-MCNC: 9.2 MG/DL (ref 8.6–10.3)
CHLORIDE SERPL-SCNC: 100 MMOL/L (ref 98–110)
CHOLEST SERPL-MCNC: 102 MG/DL
CHOLEST/HDLC SERPL: 2.7 (CALC)
CO2 SERPL-SCNC: 26 MMOL/L (ref 20–32)
CREAT SERPL-MCNC: 0.82 MG/DL (ref 0.7–1.35)
CREAT UR-MCNC: NORMAL MG/DL
EGFRCR SERPLBLD CKD-EPI 2021: 96 ML/MIN/1.73M2
EST. AVERAGE GLUCOSE BLD GHB EST-MCNC: 163 MG/DL
EST. AVERAGE GLUCOSE BLD GHB EST-SCNC: 9 MMOL/L
GLUCOSE SERPL-MCNC: 104 MG/DL (ref 65–99)
HBA1C MFR BLD: 7.3 %
HDLC SERPL-MCNC: 38 MG/DL
LDLC SERPL CALC-MCNC: 46 MG/DL (CALC)
MICROALBUMIN UR-MCNC: NORMAL
NONHDLC SERPL-MCNC: 64 MG/DL (CALC)
POTASSIUM SERPL-SCNC: 4.1 MMOL/L (ref 3.5–5.3)
PROT SERPL-MCNC: 7 G/DL (ref 6.1–8.1)
PSA SERPL-MCNC: 0.19 NG/ML
SODIUM SERPL-SCNC: 138 MMOL/L (ref 135–146)
TRIGL SERPL-MCNC: 101 MG/DL
TSH SERPL-ACNC: 2.48 MIU/L (ref 0.4–4.5)

## 2025-09-02 LAB
ALBUMIN SERPL-MCNC: 4.5 G/DL (ref 3.6–5.1)
ALBUMIN/CREAT UR: 31 MG/G CREAT
ALP SERPL-CCNC: 113 U/L (ref 35–144)
ALT SERPL-CCNC: 21 U/L (ref 9–46)
ANION GAP SERPL CALCULATED.4IONS-SCNC: 12 MMOL/L (CALC) (ref 7–17)
AST SERPL-CCNC: 17 U/L (ref 10–35)
BILIRUB SERPL-MCNC: 0.8 MG/DL (ref 0.2–1.2)
BUN SERPL-MCNC: 14 MG/DL (ref 7–25)
CALCIUM SERPL-MCNC: 9.2 MG/DL (ref 8.6–10.3)
CHLORIDE SERPL-SCNC: 100 MMOL/L (ref 98–110)
CHOLEST SERPL-MCNC: 102 MG/DL
CHOLEST/HDLC SERPL: 2.7 (CALC)
CO2 SERPL-SCNC: 26 MMOL/L (ref 20–32)
CREAT SERPL-MCNC: 0.82 MG/DL (ref 0.7–1.35)
CREAT UR-MCNC: 114 MG/DL (ref 20–320)
EGFRCR SERPLBLD CKD-EPI 2021: 96 ML/MIN/1.73M2
EST. AVERAGE GLUCOSE BLD GHB EST-MCNC: 163 MG/DL
EST. AVERAGE GLUCOSE BLD GHB EST-SCNC: 9 MMOL/L
GLUCOSE SERPL-MCNC: 104 MG/DL (ref 65–99)
HBA1C MFR BLD: 7.3 %
HDLC SERPL-MCNC: 38 MG/DL
LDLC SERPL CALC-MCNC: 46 MG/DL (CALC)
MICROALBUMIN UR-MCNC: 3.5 MG/DL
NONHDLC SERPL-MCNC: 64 MG/DL (CALC)
POTASSIUM SERPL-SCNC: 4.1 MMOL/L (ref 3.5–5.3)
PROT SERPL-MCNC: 7 G/DL (ref 6.1–8.1)
PSA SERPL-MCNC: 0.19 NG/ML
SODIUM SERPL-SCNC: 138 MMOL/L (ref 135–146)
TRIGL SERPL-MCNC: 101 MG/DL
TSH SERPL-ACNC: 2.48 MIU/L (ref 0.4–4.5)

## 2025-09-04 ENCOUNTER — APPOINTMENT (OUTPATIENT)
Dept: PRIMARY CARE | Facility: CLINIC | Age: 70
End: 2025-09-04
Payer: MEDICARE
